# Patient Record
Sex: MALE | Race: WHITE | Employment: OTHER | URBAN - METROPOLITAN AREA
[De-identification: names, ages, dates, MRNs, and addresses within clinical notes are randomized per-mention and may not be internally consistent; named-entity substitution may affect disease eponyms.]

---

## 2019-03-19 ENCOUNTER — HOSPITAL ENCOUNTER (EMERGENCY)
Age: 49
Discharge: HOME | End: 2019-03-19
Attending: EMERGENCY MEDICINE
Payer: MEDICARE

## 2019-03-19 ENCOUNTER — APPOINTMENT (OUTPATIENT)
Dept: CT IMAGING | Age: 49
End: 2019-03-19
Payer: MEDICARE

## 2019-03-19 VITALS
BODY MASS INDEX: 31.81 KG/M2 | SYSTOLIC BLOOD PRESSURE: 169 MMHG | DIASTOLIC BLOOD PRESSURE: 109 MMHG | TEMPERATURE: 98 F | WEIGHT: 240 LBS | HEART RATE: 86 BPM | OXYGEN SATURATION: 98 % | RESPIRATION RATE: 16 BRPM | HEIGHT: 73 IN

## 2019-03-19 DIAGNOSIS — R20.2 PARESTHESIA: ICD-10-CM

## 2019-03-19 DIAGNOSIS — R25.1 SHAKING: Primary | ICD-10-CM

## 2019-03-19 LAB
ALBUMIN SERPL-MCNC: 4.2 G/DL (ref 3.5–5.2)
ALP BLD-CCNC: 74 U/L (ref 40–129)
ALT SERPL-CCNC: 31 U/L (ref 0–40)
AMPHETAMINE SCREEN, URINE: NOT DETECTED
ANION GAP SERPL CALCULATED.3IONS-SCNC: 10 MMOL/L (ref 7–16)
AST SERPL-CCNC: 20 U/L (ref 0–39)
BARBITURATE SCREEN URINE: NOT DETECTED
BENZODIAZEPINE SCREEN, URINE: NOT DETECTED
BILIRUB SERPL-MCNC: 0.3 MG/DL (ref 0–1.2)
BILIRUBIN URINE: NEGATIVE
BLOOD, URINE: NEGATIVE
BUN BLDV-MCNC: 9 MG/DL (ref 6–20)
CALCIUM SERPL-MCNC: 9.2 MG/DL (ref 8.6–10.2)
CANNABINOID SCREEN URINE: NOT DETECTED
CHLORIDE BLD-SCNC: 93 MMOL/L (ref 98–107)
CLARITY: CLEAR
CO2: 27 MMOL/L (ref 22–29)
COCAINE METABOLITE SCREEN URINE: NOT DETECTED
COLOR: YELLOW
CREAT SERPL-MCNC: 1.2 MG/DL (ref 0.7–1.2)
GFR AFRICAN AMERICAN: >60
GFR NON-AFRICAN AMERICAN: >60 ML/MIN/1.73
GLUCOSE BLD-MCNC: 101 MG/DL (ref 74–99)
GLUCOSE URINE: NEGATIVE MG/DL
HCT VFR BLD CALC: 44 % (ref 37–54)
HEMOGLOBIN: 15 G/DL (ref 12.5–16.5)
KETONES, URINE: NEGATIVE MG/DL
LEUKOCYTE ESTERASE, URINE: NEGATIVE
MCH RBC QN AUTO: 29.6 PG (ref 26–35)
MCHC RBC AUTO-ENTMCNC: 34.1 % (ref 32–34.5)
MCV RBC AUTO: 86.8 FL (ref 80–99.9)
METHADONE SCREEN, URINE: NOT DETECTED
NITRITE, URINE: NEGATIVE
OPIATE SCREEN URINE: NOT DETECTED
PDW BLD-RTO: 12.2 FL (ref 11.5–15)
PH UA: 6 (ref 5–9)
PHENCYCLIDINE SCREEN URINE: NOT DETECTED
PLATELET # BLD: 280 E9/L (ref 130–450)
PMV BLD AUTO: 8.5 FL (ref 7–12)
POTASSIUM REFLEX MAGNESIUM: 4.8 MMOL/L (ref 3.5–5)
PROPOXYPHENE SCREEN: NOT DETECTED
PROTEIN UA: NEGATIVE MG/DL
RBC # BLD: 5.07 E12/L (ref 3.8–5.8)
SODIUM BLD-SCNC: 130 MMOL/L (ref 132–146)
SPECIFIC GRAVITY UA: 1.01 (ref 1–1.03)
TOTAL PROTEIN: 7.8 G/DL (ref 6.4–8.3)
TSH SERPL DL<=0.05 MIU/L-ACNC: 2.9 UIU/ML (ref 0.27–4.2)
UROBILINOGEN, URINE: 0.2 E.U./DL
WBC # BLD: 7.9 E9/L (ref 4.5–11.5)

## 2019-03-19 PROCEDURE — 80053 COMPREHEN METABOLIC PANEL: CPT

## 2019-03-19 PROCEDURE — 36415 COLL VENOUS BLD VENIPUNCTURE: CPT

## 2019-03-19 PROCEDURE — 84443 ASSAY THYROID STIM HORMONE: CPT

## 2019-03-19 PROCEDURE — 70450 CT HEAD/BRAIN W/O DYE: CPT

## 2019-03-19 PROCEDURE — 99284 EMERGENCY DEPT VISIT MOD MDM: CPT

## 2019-03-19 PROCEDURE — 93005 ELECTROCARDIOGRAM TRACING: CPT

## 2019-03-19 PROCEDURE — 81003 URINALYSIS AUTO W/O SCOPE: CPT

## 2019-03-19 PROCEDURE — 85027 COMPLETE CBC AUTOMATED: CPT

## 2019-03-19 PROCEDURE — 80307 DRUG TEST PRSMV CHEM ANLYZR: CPT

## 2019-03-19 PROCEDURE — 2580000003 HC RX 258: Performed by: EMERGENCY MEDICINE

## 2019-03-19 RX ORDER — SODIUM CHLORIDE 9 MG/ML
INJECTION, SOLUTION INTRAVENOUS ONCE
Status: COMPLETED | OUTPATIENT
Start: 2019-03-19 | End: 2019-03-19

## 2019-03-19 RX ADMIN — SODIUM CHLORIDE: 9 INJECTION, SOLUTION INTRAVENOUS at 20:08

## 2019-03-19 ASSESSMENT — ENCOUNTER SYMPTOMS
ABDOMINAL PAIN: 0
COUGH: 0
SORE THROAT: 0
SINUS PRESSURE: 0
EYE DISCHARGE: 0
EYE REDNESS: 0
DIARRHEA: 0
EYE PAIN: 0
WHEEZING: 0
NAUSEA: 0
VOMITING: 0
BACK PAIN: 0
SHORTNESS OF BREATH: 0

## 2019-03-21 LAB
EKG ATRIAL RATE: 98 BPM
EKG P AXIS: 61 DEGREES
EKG P-R INTERVAL: 148 MS
EKG Q-T INTERVAL: 346 MS
EKG QRS DURATION: 78 MS
EKG QTC CALCULATION (BAZETT): 441 MS
EKG R AXIS: 30 DEGREES
EKG T AXIS: 44 DEGREES
EKG VENTRICULAR RATE: 98 BPM

## 2022-07-15 ENCOUNTER — HOSPITAL ENCOUNTER (INPATIENT)
Age: 52
LOS: 3 days | Discharge: HOME | DRG: 310 | End: 2022-07-18
Attending: EMERGENCY MEDICINE | Admitting: INTERNAL MEDICINE
Payer: MEDICARE

## 2022-07-15 ENCOUNTER — APPOINTMENT (OUTPATIENT)
Dept: GENERAL RADIOLOGY | Age: 52
DRG: 310 | End: 2022-07-15
Payer: MEDICARE

## 2022-07-15 DIAGNOSIS — I48.91 ATRIAL FIBRILLATION WITH RAPID VENTRICULAR RESPONSE (HCC): Primary | ICD-10-CM

## 2022-07-15 LAB
ANION GAP SERPL CALCULATED.3IONS-SCNC: 13 MMOL/L (ref 7–16)
BASOPHILS ABSOLUTE: 0.05 E9/L (ref 0–0.2)
BASOPHILS RELATIVE PERCENT: 0.7 % (ref 0–2)
BUN BLDV-MCNC: 14 MG/DL (ref 6–20)
CALCIUM SERPL-MCNC: 8.7 MG/DL (ref 8.6–10.2)
CHLORIDE BLD-SCNC: 96 MMOL/L (ref 98–107)
CO2: 23 MMOL/L (ref 22–29)
CREAT SERPL-MCNC: 1.3 MG/DL (ref 0.7–1.2)
EOSINOPHILS ABSOLUTE: 0.13 E9/L (ref 0.05–0.5)
EOSINOPHILS RELATIVE PERCENT: 1.8 % (ref 0–6)
GFR AFRICAN AMERICAN: >60
GFR NON-AFRICAN AMERICAN: 58 ML/MIN/1.73
GLUCOSE BLD-MCNC: 142 MG/DL (ref 74–99)
HCT VFR BLD CALC: 40.2 % (ref 37–54)
HEMOGLOBIN: 13.5 G/DL (ref 12.5–16.5)
IMMATURE GRANULOCYTES #: 0.05 E9/L
IMMATURE GRANULOCYTES %: 0.7 % (ref 0–5)
LACTIC ACID: 1.9 MMOL/L (ref 0.5–2.2)
LYMPHOCYTES ABSOLUTE: 1.53 E9/L (ref 1.5–4)
LYMPHOCYTES RELATIVE PERCENT: 21.6 % (ref 20–42)
MAGNESIUM: 1.6 MG/DL (ref 1.6–2.6)
MCH RBC QN AUTO: 28.2 PG (ref 26–35)
MCHC RBC AUTO-ENTMCNC: 33.6 % (ref 32–34.5)
MCV RBC AUTO: 84.1 FL (ref 80–99.9)
MONOCYTES ABSOLUTE: 0.5 E9/L (ref 0.1–0.95)
MONOCYTES RELATIVE PERCENT: 7.1 % (ref 2–12)
NEUTROPHILS ABSOLUTE: 4.83 E9/L (ref 1.8–7.3)
NEUTROPHILS RELATIVE PERCENT: 68.1 % (ref 43–80)
PDW BLD-RTO: 12.9 FL (ref 11.5–15)
PLATELET # BLD: 230 E9/L (ref 130–450)
PMV BLD AUTO: 9.4 FL (ref 7–12)
POTASSIUM REFLEX MAGNESIUM: 4.3 MMOL/L (ref 3.5–5)
PRO-BNP: 1712 PG/ML (ref 0–125)
RBC # BLD: 4.78 E12/L (ref 3.8–5.8)
SODIUM BLD-SCNC: 132 MMOL/L (ref 132–146)
TROPONIN, HIGH SENSITIVITY: 8 NG/L (ref 0–11)
TSH SERPL DL<=0.05 MIU/L-ACNC: 2.6 UIU/ML (ref 0.27–4.2)
WBC # BLD: 7.1 E9/L (ref 4.5–11.5)

## 2022-07-15 PROCEDURE — 99285 EMERGENCY DEPT VISIT HI MDM: CPT

## 2022-07-15 PROCEDURE — 84439 ASSAY OF FREE THYROXINE: CPT

## 2022-07-15 PROCEDURE — 80307 DRUG TEST PRSMV CHEM ANLYZR: CPT

## 2022-07-15 PROCEDURE — 93005 ELECTROCARDIOGRAM TRACING: CPT | Performed by: PHYSICIAN ASSISTANT

## 2022-07-15 PROCEDURE — 83605 ASSAY OF LACTIC ACID: CPT

## 2022-07-15 PROCEDURE — 93005 ELECTROCARDIOGRAM TRACING: CPT | Performed by: EMERGENCY MEDICINE

## 2022-07-15 PROCEDURE — 84443 ASSAY THYROID STIM HORMONE: CPT

## 2022-07-15 PROCEDURE — 2500000003 HC RX 250 WO HCPCS: Performed by: EMERGENCY MEDICINE

## 2022-07-15 PROCEDURE — 2580000003 HC RX 258: Performed by: PHYSICIAN ASSISTANT

## 2022-07-15 PROCEDURE — 83880 ASSAY OF NATRIURETIC PEPTIDE: CPT

## 2022-07-15 PROCEDURE — 80048 BASIC METABOLIC PNL TOTAL CA: CPT

## 2022-07-15 PROCEDURE — 2060000000 HC ICU INTERMEDIATE R&B

## 2022-07-15 PROCEDURE — 6360000002 HC RX W HCPCS: Performed by: PHYSICIAN ASSISTANT

## 2022-07-15 PROCEDURE — 84484 ASSAY OF TROPONIN QUANT: CPT

## 2022-07-15 PROCEDURE — 83735 ASSAY OF MAGNESIUM: CPT

## 2022-07-15 PROCEDURE — 85025 COMPLETE CBC W/AUTO DIFF WBC: CPT

## 2022-07-15 PROCEDURE — 6370000000 HC RX 637 (ALT 250 FOR IP): Performed by: PHYSICIAN ASSISTANT

## 2022-07-15 PROCEDURE — 36415 COLL VENOUS BLD VENIPUNCTURE: CPT

## 2022-07-15 PROCEDURE — 71045 X-RAY EXAM CHEST 1 VIEW: CPT

## 2022-07-15 RX ORDER — POLYETHYLENE GLYCOL 3350 17 G/17G
17 POWDER, FOR SOLUTION ORAL DAILY PRN
Status: DISCONTINUED | OUTPATIENT
Start: 2022-07-15 | End: 2022-07-18 | Stop reason: HOSPADM

## 2022-07-15 RX ORDER — LOVASTATIN 40 MG/1
40 TABLET ORAL NIGHTLY
COMMUNITY
End: 2022-07-18

## 2022-07-15 RX ORDER — GABAPENTIN 300 MG/1
300 CAPSULE ORAL NIGHTLY
Status: DISCONTINUED | OUTPATIENT
Start: 2022-07-15 | End: 2022-07-18 | Stop reason: HOSPADM

## 2022-07-15 RX ORDER — ACETAMINOPHEN 325 MG/1
650 TABLET ORAL EVERY 6 HOURS PRN
Status: DISCONTINUED | OUTPATIENT
Start: 2022-07-15 | End: 2022-07-18 | Stop reason: HOSPADM

## 2022-07-15 RX ORDER — QUETIAPINE FUMARATE 50 MG/1
50 TABLET, FILM COATED ORAL NIGHTLY
COMMUNITY

## 2022-07-15 RX ORDER — ACETAMINOPHEN 650 MG/1
650 SUPPOSITORY RECTAL EVERY 6 HOURS PRN
Status: DISCONTINUED | OUTPATIENT
Start: 2022-07-15 | End: 2022-07-18 | Stop reason: HOSPADM

## 2022-07-15 RX ORDER — GABAPENTIN 300 MG/1
300 CAPSULE ORAL NIGHTLY
COMMUNITY

## 2022-07-15 RX ORDER — METOPROLOL TARTRATE 50 MG/1
50 TABLET, FILM COATED ORAL 2 TIMES DAILY
Status: DISCONTINUED | OUTPATIENT
Start: 2022-07-15 | End: 2022-07-16

## 2022-07-15 RX ORDER — SODIUM CHLORIDE 0.9 % (FLUSH) 0.9 %
5-40 SYRINGE (ML) INJECTION EVERY 12 HOURS SCHEDULED
Status: DISCONTINUED | OUTPATIENT
Start: 2022-07-15 | End: 2022-07-18 | Stop reason: HOSPADM

## 2022-07-15 RX ORDER — SODIUM CHLORIDE 9 MG/ML
INJECTION, SOLUTION INTRAVENOUS PRN
Status: DISCONTINUED | OUTPATIENT
Start: 2022-07-15 | End: 2022-07-18 | Stop reason: HOSPADM

## 2022-07-15 RX ORDER — SODIUM CHLORIDE 0.9 % (FLUSH) 0.9 %
5-40 SYRINGE (ML) INJECTION PRN
Status: DISCONTINUED | OUTPATIENT
Start: 2022-07-15 | End: 2022-07-18 | Stop reason: HOSPADM

## 2022-07-15 RX ORDER — ENOXAPARIN SODIUM 150 MG/ML
1 INJECTION SUBCUTANEOUS 2 TIMES DAILY
Status: DISCONTINUED | OUTPATIENT
Start: 2022-07-15 | End: 2022-07-18

## 2022-07-15 RX ORDER — METOPROLOL TARTRATE 50 MG/1
50 TABLET, FILM COATED ORAL 2 TIMES DAILY
COMMUNITY
End: 2022-07-18

## 2022-07-15 RX ORDER — DILTIAZEM HYDROCHLORIDE 5 MG/ML
15 INJECTION INTRAVENOUS ONCE
Status: COMPLETED | OUTPATIENT
Start: 2022-07-15 | End: 2022-07-15

## 2022-07-15 RX ORDER — LANOLIN ALCOHOL/MO/W.PET/CERES
9 CREAM (GRAM) TOPICAL NIGHTLY PRN
Status: DISCONTINUED | OUTPATIENT
Start: 2022-07-15 | End: 2022-07-18 | Stop reason: HOSPADM

## 2022-07-15 RX ORDER — ATORVASTATIN CALCIUM 10 MG/1
10 TABLET, FILM COATED ORAL DAILY
Status: DISCONTINUED | OUTPATIENT
Start: 2022-07-16 | End: 2022-07-18 | Stop reason: HOSPADM

## 2022-07-15 RX ORDER — LANOLIN ALCOHOL/MO/W.PET/CERES
10 CREAM (GRAM) TOPICAL NIGHTLY PRN
COMMUNITY

## 2022-07-15 RX ORDER — QUETIAPINE FUMARATE 25 MG/1
50 TABLET, FILM COATED ORAL NIGHTLY
Status: DISCONTINUED | OUTPATIENT
Start: 2022-07-15 | End: 2022-07-17

## 2022-07-15 RX ORDER — PROMETHAZINE HYDROCHLORIDE 25 MG/1
12.5 TABLET ORAL EVERY 6 HOURS PRN
Status: DISCONTINUED | OUTPATIENT
Start: 2022-07-15 | End: 2022-07-18 | Stop reason: HOSPADM

## 2022-07-15 RX ORDER — ONDANSETRON 2 MG/ML
4 INJECTION INTRAMUSCULAR; INTRAVENOUS EVERY 6 HOURS PRN
Status: DISCONTINUED | OUTPATIENT
Start: 2022-07-15 | End: 2022-07-18 | Stop reason: HOSPADM

## 2022-07-15 RX ORDER — ENOXAPARIN SODIUM 150 MG/ML
1 INJECTION SUBCUTANEOUS ONCE
Status: DISCONTINUED | OUTPATIENT
Start: 2022-07-15 | End: 2022-07-15 | Stop reason: DRUGHIGH

## 2022-07-15 RX ADMIN — DILTIAZEM HYDROCHLORIDE 15 MG: 5 INJECTION INTRAVENOUS at 19:14

## 2022-07-15 RX ADMIN — DEXTROSE MONOHYDRATE 5 MG/HR: 50 INJECTION, SOLUTION INTRAVENOUS at 19:18

## 2022-07-15 RX ADMIN — Medication 9 MG: at 23:14

## 2022-07-15 RX ADMIN — ENOXAPARIN SODIUM 120 MG: 150 INJECTION SUBCUTANEOUS at 23:15

## 2022-07-15 RX ADMIN — QUETIAPINE FUMARATE 50 MG: 25 TABLET ORAL at 23:15

## 2022-07-15 RX ADMIN — METOPROLOL TARTRATE 50 MG: 50 TABLET, FILM COATED ORAL at 23:15

## 2022-07-15 RX ADMIN — GABAPENTIN 300 MG: 300 CAPSULE ORAL at 23:14

## 2022-07-15 RX ADMIN — Medication 10 ML: at 23:16

## 2022-07-15 ASSESSMENT — ENCOUNTER SYMPTOMS
BACK PAIN: 0
ABDOMINAL PAIN: 0
EYE REDNESS: 0
SHORTNESS OF BREATH: 0
FACIAL SWELLING: 0
RECTAL PAIN: 0
COUGH: 0
NAUSEA: 0

## 2022-07-15 NOTE — ED PROVIDER NOTES
History:  has a past medical history of Anxiety, Depression, and Hypertension. Past Surgical History:  has no past surgical history on file. Social History:  reports that he has never smoked. His smokeless tobacco use includes snuff. Family History: family history is not on file. The patients home medications have been reviewed. Allergies: Patient has no known allergies.     -------------------------------------------------- RESULTS -------------------------------------------------  All laboratory and radiology results have been personally reviewed by myself   LABS:  Results for orders placed or performed during the hospital encounter of 07/15/22   Lactic Acid   Result Value Ref Range    Lactic Acid 1.9 0.5 - 2.2 mmol/L   CBC with Auto Differential   Result Value Ref Range    WBC 7.1 4.5 - 11.5 E9/L    RBC 4.78 3.80 - 5.80 E12/L    Hemoglobin 13.5 12.5 - 16.5 g/dL    Hematocrit 40.2 37.0 - 54.0 %    MCV 84.1 80.0 - 99.9 fL    MCH 28.2 26.0 - 35.0 pg    MCHC 33.6 32.0 - 34.5 %    RDW 12.9 11.5 - 15.0 fL    Platelets 843 596 - 235 E9/L    MPV 9.4 7.0 - 12.0 fL    Neutrophils % 68.1 43.0 - 80.0 %    Immature Granulocytes % 0.7 0.0 - 5.0 %    Lymphocytes % 21.6 20.0 - 42.0 %    Monocytes % 7.1 2.0 - 12.0 %    Eosinophils % 1.8 0.0 - 6.0 %    Basophils % 0.7 0.0 - 2.0 %    Neutrophils Absolute 4.83 1.80 - 7.30 E9/L    Immature Granulocytes # 0.05 E9/L    Lymphocytes Absolute 1.53 1.50 - 4.00 E9/L    Monocytes Absolute 0.50 0.10 - 0.95 E9/L    Eosinophils Absolute 0.13 0.05 - 0.50 E9/L    Basophils Absolute 0.05 0.00 - 0.20 N4/R   Basic Metabolic Panel w/ Reflex to MG   Result Value Ref Range    Sodium 132 132 - 146 mmol/L    Potassium reflex Magnesium 4.3 3.5 - 5.0 mmol/L    Chloride 96 (L) 98 - 107 mmol/L    CO2 23 22 - 29 mmol/L    Anion Gap 13 7 - 16 mmol/L    Glucose 142 (H) 74 - 99 mg/dL    BUN 14 6 - 20 mg/dL    CREATININE 1.3 (H) 0.7 - 1.2 mg/dL    GFR Non-African American 58 >=60 mL/min/1.73 Musculoskeletal:         General: No swelling or deformity. Cervical back: Normal range of motion and neck supple. No muscular tenderness. Right lower leg: No edema. Left lower leg: No edema. Skin:     General: Skin is warm and dry. Capillary Refill: Capillary refill takes less than 2 seconds. Neurological:      General: No focal deficit present. Mental Status: He is alert and oriented to person, place, and time. Sensory: No sensory deficit. Motor: No weakness. Psychiatric:         Mood and Affect: Mood normal.             ------------------------------ ED COURSE/MEDICAL DECISION MAKING----------------------  Medications   dilTIAZem 100 mg in dextrose 5 % 100 mL infusion ([facility]) (5 mg/hr IntraVENous New Bag 7/15/22 1918)   enoxaparin (LOVENOX) injection 120 mg (has no administration in time range)   dilTIAZem injection 15 mg (15 mg IntraVENous Given 7/15/22 1914)     EKG: This EKG is signed and interpreted by me. [facility]:[id number]  Rate: 126  Rhythm: Atrial fibrillation and Rapid ventricular response  Interpretation: nonspecific st/t changes  Comparison: no previous EKG available      ED COURSE:  ED Course as of 07/15/22 2018   Fri Jul 15, 2022   1911 D/w dr Tana jeter for dr Olga Macias will admit [KARI]   1926 HR now in 90s post cardizem [KARI]      ED Course User Index  [KARI] Ada Means DO     Critical care:  Please note that the withdrawal or failure to initiate urgent interventions for this patient would likely result in a life threatening deterioration or permanent disability. Accordingly this patient received 30 minutes of critical care time, excluding separately billable procedures. [id number]  Atrial Fibrillation Risk Score Calculator for Non-Rheumatic AF     Condition Points   C    Congestive Heart Failure no (0)   H    Hypertension:  blood pressure consistently above 140/90 mmHg (or treated     hypertension on medication)   yes (1)   A    Age ? 75 years no   A    Age 65-74 years no (0)   D    Diabetes Mellitus no (0)   S    Prior Stroke / TIA or Thromboembolism no   V    Vascular Disease no (0)   S    Sex Female no (0)        TOTAL POINTS 1     Annual Stroke Risk:  According to the 2012 ESC Guideline Updates    CHADS2 Score Stroke Risk %   0 0.78   1 2   2 3.7   3 5.9   4 9.3   5 15.3   6 19.7                       7                                     21.5                                 8 22.4   9 23.6         Medical Decision Making:    Patient found to have A. fib with rapid ventricular response. Patient is not anticoagulated. Patient's Arben score is a 1. We will give him a dose of Lovenox since he has been admitted to the hospital.  He was given IV bolus of Cardizem placed on Cardizem drip. His heart rate is markedly improved. Patient will be admitted to the hospital for further cardiology evaluation. I discussed with the hospitalist who will admit. Counseling: The emergency provider has spoken with the patient and discussed todays results, in addition to providing specific details for the plan of care and counseling regarding the diagnosis and prognosis. Questions are answered at this time and they are agreeable with the plan.      --------------------------------- IMPRESSION AND DISPOSITION ---------------------------------    IMPRESSION  1. Atrial fibrillation with rapid ventricular response (HCC)        DISPOSITION  Disposition: Admit to telemetry  Patient condition is fair      NOTE: This report was transcribed using voice recognition software.  Every effort was made to ensure accuracy; however, inadvertent computerized transcription errors may be present       Padmini Han DO  07/15/22 2018

## 2022-07-15 NOTE — Clinical Note
Discharge Plan[de-identified] Other/[facility])   Telemetry/Cardiac Monitoring Required?: Yes

## 2022-07-16 LAB
AMPHETAMINE SCREEN, URINE: NOT DETECTED
BARBITURATE SCREEN URINE: NOT DETECTED
BENZODIAZEPINE SCREEN, URINE: NOT DETECTED
CANNABINOID SCREEN URINE: NOT DETECTED
COCAINE METABOLITE SCREEN URINE: NOT DETECTED
FENTANYL SCREEN, URINE: NOT DETECTED
LACTIC ACID: 1.3 MMOL/L (ref 0.5–2.2)
LV EF: 60 %
LVEF MODALITY: NORMAL
Lab: NORMAL
METHADONE SCREEN, URINE: NOT DETECTED
OPIATE SCREEN URINE: NOT DETECTED
OXYCODONE URINE: NOT DETECTED
PHENCYCLIDINE SCREEN URINE: NOT DETECTED
T3 TOTAL: 102 NG/DL (ref 80–200)
T4 FREE: 0.92 NG/DL (ref 0.93–1.7)
TROPONIN, HIGH SENSITIVITY: 10 NG/L (ref 0–11)
TROPONIN, HIGH SENSITIVITY: 9 NG/L (ref 0–11)

## 2022-07-16 PROCEDURE — 2060000000 HC ICU INTERMEDIATE R&B

## 2022-07-16 PROCEDURE — 93306 TTE W/DOPPLER COMPLETE: CPT

## 2022-07-16 PROCEDURE — 6360000002 HC RX W HCPCS: Performed by: PHYSICIAN ASSISTANT

## 2022-07-16 PROCEDURE — 6360000002 HC RX W HCPCS: Performed by: NURSE PRACTITIONER

## 2022-07-16 PROCEDURE — 2580000003 HC RX 258: Performed by: PHYSICIAN ASSISTANT

## 2022-07-16 PROCEDURE — 6370000000 HC RX 637 (ALT 250 FOR IP): Performed by: PHYSICIAN ASSISTANT

## 2022-07-16 PROCEDURE — 99222 1ST HOSP IP/OBS MODERATE 55: CPT | Performed by: INTERNAL MEDICINE

## 2022-07-16 PROCEDURE — 6370000000 HC RX 637 (ALT 250 FOR IP): Performed by: INTERNAL MEDICINE

## 2022-07-16 PROCEDURE — 84484 ASSAY OF TROPONIN QUANT: CPT

## 2022-07-16 PROCEDURE — 36415 COLL VENOUS BLD VENIPUNCTURE: CPT

## 2022-07-16 PROCEDURE — 83605 ASSAY OF LACTIC ACID: CPT

## 2022-07-16 PROCEDURE — 84480 ASSAY TRIIODOTHYRONINE (T3): CPT

## 2022-07-16 RX ORDER — METOPROLOL SUCCINATE 50 MG/1
50 TABLET, EXTENDED RELEASE ORAL 2 TIMES DAILY
Status: DISCONTINUED | OUTPATIENT
Start: 2022-07-16 | End: 2022-07-17

## 2022-07-16 RX ORDER — DOCUSATE SODIUM 100 MG/1
100 CAPSULE, LIQUID FILLED ORAL NIGHTLY
Status: DISCONTINUED | OUTPATIENT
Start: 2022-07-16 | End: 2022-07-18 | Stop reason: HOSPADM

## 2022-07-16 RX ORDER — HYDRALAZINE HYDROCHLORIDE 20 MG/ML
5 INJECTION INTRAMUSCULAR; INTRAVENOUS EVERY 6 HOURS PRN
Status: DISCONTINUED | OUTPATIENT
Start: 2022-07-16 | End: 2022-07-18 | Stop reason: HOSPADM

## 2022-07-16 RX ADMIN — Medication 9 MG: at 20:10

## 2022-07-16 RX ADMIN — QUETIAPINE FUMARATE 50 MG: 25 TABLET ORAL at 20:11

## 2022-07-16 RX ADMIN — ENOXAPARIN SODIUM 120 MG: 150 INJECTION SUBCUTANEOUS at 09:28

## 2022-07-16 RX ADMIN — METOPROLOL SUCCINATE 50 MG: 50 TABLET, EXTENDED RELEASE ORAL at 16:29

## 2022-07-16 RX ADMIN — ENOXAPARIN SODIUM 120 MG: 150 INJECTION SUBCUTANEOUS at 20:10

## 2022-07-16 RX ADMIN — METOPROLOL TARTRATE 50 MG: 50 TABLET, FILM COATED ORAL at 09:28

## 2022-07-16 RX ADMIN — GABAPENTIN 300 MG: 300 CAPSULE ORAL at 20:11

## 2022-07-16 RX ADMIN — Medication 10 ML: at 20:13

## 2022-07-16 RX ADMIN — HYDRALAZINE HYDROCHLORIDE 5 MG: 20 INJECTION, SOLUTION INTRAMUSCULAR; INTRAVENOUS at 20:11

## 2022-07-16 RX ADMIN — ATORVASTATIN CALCIUM 10 MG: 10 TABLET, FILM COATED ORAL at 20:10

## 2022-07-16 NOTE — H&P
Hospital Medicine History & Physical      PCP: Keely Malave MD    Date of Admission: 7/15/2022    Date of Service: . July 16, 2022    Chief Complaint:  PALPITATION AND CHEST PAIN       History Of Present Illness:     45 y.o. male presented with  ONSET 2 WEEKS AGO, [address]. CHEST PAIN SQUEEZING IN CHARACTER, LOCATED ACW, POS SOB, .  NON RADIATING , NO NV, NO DIAPHORESIS. Past Medical History:          Diagnosis Date    Anxiety     Depression     Hypertension        Past Surgical History:      History reviewed. No pertinent surgical history. Medications Prior to Admission:      Prior to Admission medications    Medication Sig Start Date End Date Taking? Authorizing Provider   QUEtiapine (SEROQUEL) 50 MG tablet Take 50 mg by mouth nightly Take 3 tablets at bedtime   Yes Historical Provider, MD   lovastatin (MEVACOR) 40 MG tablet Take 40 mg by mouth nightly   Yes Historical Provider, MD   metoprolol tartrate (LOPRESSOR) 50 MG tablet Take 50 mg by mouth in the morning and 50 mg before bedtime. Yes Historical Provider, MD   melatonin 3 MG TABS tablet Take 10 mg by mouth nightly as needed   Yes Historical Provider, MD   gabapentin (NEURONTIN) 300 MG capsule Take 300 mg by mouth nightly. Yes Historical Provider, MD   lamoTRIgine (LAMICTAL) 200 MG tablet Take 200 mg by mouth nightly    Historical Provider, MD   QUEtiapine (SEROQUEL XR) 200 MG XR tablet Take 200 mg by mouth nightly. Historical Provider, MD   QUEtiapine (SEROQUEL) 25 MG tablet Take 25 mg by mouth nightly    Historical Provider, MD   zolpidem (AMBIEN) 10 MG tablet Take 10 mg by mouth nightly as needed. Historical Provider, MD   clonazePAM (KLONOPIN) 2 MG tablet Take 2 mg by mouth 3 times daily as needed.     Historical Provider, MD   lisinopril (PRINIVIL;ZESTRIL) 20 MG tablet Take 40 mg by mouth in the morning. Robby Leiva Historical Provider, MD       Allergies:  Patient has no known allergies. Social History:      The patient currently lives ALONE    TOBACCO:   reports that he has never smoked. His smokeless tobacco use includes snuff. ETOH:   has no history on file for alcohol use. Family History:    NOT OBTAINED      REVIEW OF SYSTEMS:   Pertinent positives as noted in the HPI. All other systems reviewed and negative. PHYSICAL EXAM:    BP (!) 150/94   Pulse 75   Temp 98.8 °F (37.1 °C) (Oral)   Resp 16   Ht 6' 1\" (1.854 m)   Wt 248 lb 4.8 oz (112.6 kg)   SpO2 99%   BMI 32.76 kg/m²     General appearance:  No apparent distress, appears stated age and cooperative. HEENT:  Normal cephalic, atraumatic without obvious deformity. Pupils equal, round, and reactive to light. Extra ocular muscles intact. Conjunctivae/corneas clear. Neck: Supple, with full range of motion. No jugular venous distention. Trachea midline. Respiratory:  Normal respiratory effort. Clear to auscultation, bilaterally without Rales/Wheezes/Rhonchi. Cardiovascular:  IRREG  Abdomen: Soft, non-tender, non-distended with normal bowel sounds. Musculoskeletal:  No clubbing, cyanosis or edema bilaterally. Skin: Skin color, texture, turgor normal.  No rashes or lesions. Neurologic:  Neurovascularly intact without any focal sensory/motor deficits. Cranial nerves: II-XII intact, grossly non-focal.  Psychiatric:  Alert and oriented, thought content appropriate, normal insight        Labs:     Recent Labs     07/15/22  1903   WBC 7.1   HGB 13.5   HCT 40.2        Recent Labs     07/15/22  1903      K 4.3   CL 96*   CO2 23   BUN 14   CREATININE 1.3*   CALCIUM 8.7     No results for input(s): AST, ALT, BILIDIR, BILITOT, ALKPHOS in the last 72 hours. No results for input(s): INR in the last 72 hours. No results for input(s): Curlie Lat in the last 72 hours.     Urinalysis:      Lab Results   Component Value

## 2022-07-16 NOTE — PROGRESS NOTES
Call back from Dr. Marcos Venegas , advised her of the pt blood pressure reading, she advised to administer the Toprol XL 50mg early. The medication is due at 1800.

## 2022-07-16 NOTE — PROGRESS NOTES
Dr Hawkins Repress paged and left message regarding his bp of 142/100 and no prn blood pressure med orders. Will wait for orders.

## 2022-07-16 NOTE — CONSULTS
CHIEF COMPLAINT: New onset Afib    HISTORY OF PRESENT ILLNESS: Patient is a 45 y.o. male seen at the request of Logan Morales MD and not followed by cardiology. States he has been having palpitations, SOB and chest symptoms for several weeks. Noted to have afib with RVR prompting cardiology consultation. Denies prior cardiac history. States some SOB currently.      Past Medical History:   Diagnosis Date    Anxiety     Depression     Hypertension        Patient Active Problem List   Diagnosis    Atrial fibrillation with rapid ventricular response (HCC)    Atrial fibrillation with RVR ([facility] [address]       No Known Allergies    Current Facility-Administered Medications   Medication Dose Route Frequency Provider Last Rate Last Admin    docusate sodium (COLACE) capsule 100 mg  100 mg Oral Nightly Gage Rise DrDO shira        trimethobenzamide Charmel Alderman) injection 200 mg  200 mg IntraMUSCular Q6H PRN Margarita Hawthorne DO        dilTIAZem 100 mg in dextrose 5 % 100 mL infusion ([facility])  2.5-15 mg/hr IntraVENous Continuous CANDIS Yost   Held at 07/16/22 0120    sodium chloride flush 0.9 % injection 5-40 mL  5-40 mL IntraVENous 2 times per day CANDIS Yost   10 mL at 07/15/22 2316    sodium chloride flush 0.9 % injection 5-40 mL  5-40 mL IntraVENous PRN CANDIS Yost        0.9 % sodium chloride infusion   IntraVENous PRN CANDIS Yost        polyethylene glycol (GLYCOLAX) packet 17 g  17 g Oral Daily PRN CANDIS Yost        acetaminophen (TYLENOL) tablet 650 mg  650 mg Oral Q6H PRN CANDIS Yost        Or    acetaminophen (TYLENOL) suppository 650 mg  650 mg Rectal Q6H PRN CANDIS Yost        promethazine (PHENERGAN) tablet 12.5 mg  12.5 mg Oral Q6H PRN CANDIS Yost        Or    ondansetron (ZOFRAN) injection 4 mg  4 mg IntraVENous Q6H PRN CANDIS Yost        enoxaparin (LOVENOX) injection 120 mg  1 mg/kg SubCUTAneous BID CANDIS Yost   120 mg at 07/16/22 5256    gabapentin (NEURONTIN) capsule 300 mg  300 mg Oral Nightly Hardik Mealing, PA   300 mg at 07/15/22 2314    atorvastatin (LIPITOR) tablet 10 mg  10 mg Oral Daily Hardik Mealing, PA        melatonin tablet 9 mg  9 mg Oral Nightly PRN Hardik Mealing, PA   9 mg at 07/15/22 2314    metoprolol tartrate (LOPRESSOR) tablet 50 mg  50 mg Oral BID Hardik Mealing, PA   50 mg at 07/16/22 3651    QUEtiapine (SEROQUEL) tablet 50 mg  50 mg Oral Nightly Hardik Mealing, PA   50 mg at 07/15/22 2315       Social History     Socioeconomic History    Marital status:      Spouse name: Not on file    Number of children: Not on file    Years of education: Not on file    Highest education level: Not on file   Occupational History    Not on file   Tobacco Use    Smoking status: Never    Smokeless tobacco: Current     Types: Snuff   Substance and Sexual Activity    Alcohol use: Not on file    Drug use: Not on file    Sexual activity: Not on file   Other Topics Concern    Not on file   Social History Narrative    Not on file     Social Determinants of Health     Financial Resource Strain: Not on file   Food Insecurity: Not on file   Transportation Needs: Not on file   Physical Activity: Not on file   Stress: Not on file   Social Connections: Not on file   Intimate Partner Violence: Not on file   Housing Stability: Not on file       History reviewed. No pertinent family history. Review of Systems:   Heart: as above   Lungs: as above   Eyes: denies changes in vision or discharge. Ears: denies changes in hearing or pain. Nose: denies epistaxis or masses   Throat: denies sore throat or trouble swallowing. Neuro: denies numbness, tingling, tremors. Skin: denies rashes or itching. : denies hematuria, dysuria   GI: denies vomiting, diarrhea   Psych: denies mood changed, anxiety, depression. all others negative.     Physical Exam   BP (!) 150/94   Pulse 75   Temp 98.1 °F (36.7 °C) (Oral)   Resp 16   Ht 6' 1\" (1.854 m) Wt 248 lb 4.8 oz (112.6 kg)   SpO2 100%   BMI 32.76 kg/m²   Constitutional: Oriented to person, place, and time. Well-developed and well-nourished. No distress. Head: Normocephalic and atraumatic. Eyes: EOM are normal. Pupils are equal, round, and reactive to light. Neck: Normal range of motion. Neck supple. No hepatojugular reflux and no JVD present. Carotid bruit is not present. No tracheal deviation present. No thyromegaly present. Cardiovascular: Normal rate, regular rhythm, normal heart sounds and intact distal pulses. Exam reveals no gallop and no friction rub. No murmur heard. Pulmonary/Chest: Effort normal and breath sounds normal. No respiratory distress. No wheezes. No rales. No tenderness. Abdominal: Soft. Bowel sounds are normal. No distension and no mass. No tenderness. No rebound and no guarding. Musculoskeletal: Normal range of motion. No edema and no tenderness. Lymphadenopathy:   No cervical adenopathy. No groin adenopathy. Neurological: Alert and oriented to person, place, and time. Skin: Skin is warm and dry. No rash noted. Not diaphoretic. No erythema. Psychiatric: Normal mood and affect.  Behavior is normal.     CBC:   Lab Results   Component Value Date/Time    WBC 7.1 07/15/2022 07:03 PM    RBC 4.78 07/15/2022 07:03 PM    HGB 13.5 07/15/2022 07:03 PM    HCT 40.2 07/15/2022 07:03 PM    MCV 84.1 07/15/2022 07:03 PM    MCH 28.2 07/15/2022 07:03 PM    MCHC 33.6 07/15/2022 07:03 PM    RDW 12.9 07/15/2022 07:03 PM     07/15/2022 07:03 PM    MPV 9.4 07/15/2022 07:03 PM     BMP:   Lab Results   Component Value Date/Time     07/15/2022 07:03 PM    K 4.3 07/15/2022 07:03 PM    CL 96 07/15/2022 07:03 PM    CO2 23 07/15/2022 07:03 PM    BUN 14 07/15/2022 07:03 PM    LABALBU 4.2 03/19/2019 06:29 PM    CREATININE 1.3 07/15/2022 07:03 PM    CALCIUM 8.7 07/15/2022 07:03 PM    GFRAA >60 07/15/2022 07:03 PM    LABGLOM 58 07/15/2022 07:03 PM     Magnesium:    Lab Results Component Value Date/Time    MG 1.6 07/15/2022 07:03 PM     Cardiac Enzymes:   Lab Results   Component Value Date    TROPHS 8 07/15/2022      PT/INR:  No results found for: PROTIME, INR  TSH:    Lab Results   Component Value Date/Time    TSH 2.600 07/15/2022 07:03 PM     Rhythm Strip: atrial fibrillation. EKG:  nonspecific ST and T waves changes, atrial fibrillation. ASSESSMENT AND PLAN:  Patient Active Problem List   Diagnosis    Atrial fibrillation with rapid ventricular response (HCC)    Atrial fibrillation with RVR ([address]     1. Afib:    Chart/labs/EKG reviewed. Echo. Rate control. Transition to PO BB. Lovenox for now. DOAC on discharge. JOSE-DCCV Monday AM.    2. HTN: Observe. Viviana García D.O.   Cardiologist  Cardiology, [facility]

## 2022-07-17 ENCOUNTER — ANESTHESIA (OUTPATIENT)
Dept: ONCOLOGY | Age: 52
End: 2022-07-17

## 2022-07-17 ENCOUNTER — ANESTHESIA EVENT (OUTPATIENT)
Dept: ONCOLOGY | Age: 52
End: 2022-07-17

## 2022-07-17 LAB
ANION GAP SERPL CALCULATED.3IONS-SCNC: 12 MMOL/L (ref 7–16)
BUN BLDV-MCNC: 10 MG/DL (ref 6–20)
CALCIUM SERPL-MCNC: 9.4 MG/DL (ref 8.6–10.2)
CHLORIDE BLD-SCNC: 98 MMOL/L (ref 98–107)
CO2: 27 MMOL/L (ref 22–29)
CREAT SERPL-MCNC: 1.3 MG/DL (ref 0.7–1.2)
GFR AFRICAN AMERICAN: >60
GFR NON-AFRICAN AMERICAN: 58 ML/MIN/1.73
GLUCOSE BLD-MCNC: 106 MG/DL (ref 74–99)
POTASSIUM SERPL-SCNC: 4.4 MMOL/L (ref 3.5–5)
SODIUM BLD-SCNC: 137 MMOL/L (ref 132–146)

## 2022-07-17 PROCEDURE — 80048 BASIC METABOLIC PNL TOTAL CA: CPT

## 2022-07-17 PROCEDURE — 99233 SBSQ HOSP IP/OBS HIGH 50: CPT | Performed by: INTERNAL MEDICINE

## 2022-07-17 PROCEDURE — 97161 PT EVAL LOW COMPLEX 20 MIN: CPT

## 2022-07-17 PROCEDURE — 2580000003 HC RX 258: Performed by: PHYSICIAN ASSISTANT

## 2022-07-17 PROCEDURE — 2060000000 HC ICU INTERMEDIATE R&B

## 2022-07-17 PROCEDURE — 6370000000 HC RX 637 (ALT 250 FOR IP): Performed by: INTERNAL MEDICINE

## 2022-07-17 PROCEDURE — 6360000002 HC RX W HCPCS: Performed by: INTERNAL MEDICINE

## 2022-07-17 PROCEDURE — 36415 COLL VENOUS BLD VENIPUNCTURE: CPT

## 2022-07-17 PROCEDURE — 6360000002 HC RX W HCPCS: Performed by: NURSE PRACTITIONER

## 2022-07-17 PROCEDURE — 6360000002 HC RX W HCPCS: Performed by: PHYSICIAN ASSISTANT

## 2022-07-17 PROCEDURE — 6370000000 HC RX 637 (ALT 250 FOR IP): Performed by: PHYSICIAN ASSISTANT

## 2022-07-17 RX ORDER — DIGOXIN 0.25 MG/ML
500 INJECTION INTRAMUSCULAR; INTRAVENOUS ONCE
Status: COMPLETED | OUTPATIENT
Start: 2022-07-17 | End: 2022-07-17

## 2022-07-17 RX ORDER — QUETIAPINE FUMARATE 25 MG/1
75 TABLET, FILM COATED ORAL NIGHTLY
Status: DISCONTINUED | OUTPATIENT
Start: 2022-07-17 | End: 2022-07-18 | Stop reason: HOSPADM

## 2022-07-17 RX ORDER — METOPROLOL SUCCINATE 100 MG/1
100 TABLET, EXTENDED RELEASE ORAL ONCE
Status: COMPLETED | OUTPATIENT
Start: 2022-07-17 | End: 2022-07-17

## 2022-07-17 RX ORDER — LAMOTRIGINE 100 MG/1
200 TABLET ORAL NIGHTLY
Status: DISCONTINUED | OUTPATIENT
Start: 2022-07-17 | End: 2022-07-18 | Stop reason: HOSPADM

## 2022-07-17 RX ORDER — METOPROLOL SUCCINATE 100 MG/1
100 TABLET, EXTENDED RELEASE ORAL 2 TIMES DAILY
Status: DISCONTINUED | OUTPATIENT
Start: 2022-07-17 | End: 2022-07-18 | Stop reason: HOSPADM

## 2022-07-17 RX ORDER — CLONAZEPAM 2 MG/1
2 TABLET ORAL EVERY 8 HOURS PRN
Status: DISCONTINUED | OUTPATIENT
Start: 2022-07-17 | End: 2022-07-18 | Stop reason: HOSPADM

## 2022-07-17 RX ADMIN — LAMOTRIGINE 200 MG: 100 TABLET ORAL at 21:35

## 2022-07-17 RX ADMIN — Medication 10 ML: at 08:52

## 2022-07-17 RX ADMIN — SODIUM CHLORIDE, PRESERVATIVE FREE 10 ML: 5 INJECTION INTRAVENOUS at 03:28

## 2022-07-17 RX ADMIN — DIGOXIN 500 MCG: 0.25 INJECTION INTRAMUSCULAR; INTRAVENOUS at 16:19

## 2022-07-17 RX ADMIN — ATORVASTATIN CALCIUM 10 MG: 10 TABLET, FILM COATED ORAL at 21:35

## 2022-07-17 RX ADMIN — METOPROLOL SUCCINATE 100 MG: 100 TABLET, EXTENDED RELEASE ORAL at 16:19

## 2022-07-17 RX ADMIN — GABAPENTIN 300 MG: 300 CAPSULE ORAL at 21:35

## 2022-07-17 RX ADMIN — HYDRALAZINE HYDROCHLORIDE 5 MG: 20 INJECTION, SOLUTION INTRAMUSCULAR; INTRAVENOUS at 03:27

## 2022-07-17 RX ADMIN — QUETIAPINE FUMARATE 75 MG: 25 TABLET ORAL at 21:36

## 2022-07-17 RX ADMIN — CLONAZEPAM 2 MG: 2 TABLET ORAL at 21:35

## 2022-07-17 RX ADMIN — Medication 10 ML: at 21:37

## 2022-07-17 RX ADMIN — ENOXAPARIN SODIUM 120 MG: 150 INJECTION SUBCUTANEOUS at 08:52

## 2022-07-17 RX ADMIN — HYDRALAZINE HYDROCHLORIDE 5 MG: 20 INJECTION, SOLUTION INTRAMUSCULAR; INTRAVENOUS at 23:39

## 2022-07-17 RX ADMIN — CLONAZEPAM 2 MG: 2 TABLET ORAL at 13:31

## 2022-07-17 RX ADMIN — ENOXAPARIN SODIUM 120 MG: 150 INJECTION SUBCUTANEOUS at 21:40

## 2022-07-17 RX ADMIN — METOPROLOL SUCCINATE 100 MG: 100 TABLET, EXTENDED RELEASE ORAL at 21:34

## 2022-07-17 RX ADMIN — METOPROLOL SUCCINATE 50 MG: 50 TABLET, EXTENDED RELEASE ORAL at 08:52

## 2022-07-17 NOTE — PROGRESS NOTES
Initial Evaluation        Attending Provider:  Rikki Dave DO    Evaluating PT:  Doris Bansal PT, RRT, [place] [place] [address]    Room #:  0889/3751-A  Diagnosis:  Atrial fibrillation with rapid ventricular response  Pertinent PMHx/PSHx:  See PMH  Procedure/Surgery:  NA  Precautions:  General  Equipment Needs:  None    SUBJECTIVE:    Pt lives with alone in a 2 story home with 3 stairs and 0 rail to enter. There are 12 steps and 1 rail to 2nd floor bed and bath. Pt ambulated with no AD PTA. OBJECTIVE:   Initial Evaluation  Date: 7/17/22 Treatment Short Term/ Long Term   Goals   Was pt agreeable to Eval/treatment? Yes     Does pt have pain? No     Bed Mobility  Rolling: indep  Supine to sit: indep  Sit to supine: indep  Scooting: indep     Transfers Sit to stand: indep  Stand to sit: indep  Stand pivot: indep     Ambulation   200 feet with no AD indep       Steps NA-predict indep based on mobility      ROM WFL     MMT WFL     AM-PAC 6 Clicks 12/72       Pt is A & O x 3   Sensation:  Pt denies numbness and tingling to extremities  Edema:  NA  Balance: sitting:indep and indep standing:   Endurance: Normal      ASSESSMENT:    Comments:  Was walking indep in [place] on contact. Indep all levels of mobility w/o any fall risks. Remained in room on my exit. Treatment:  Patient practiced and was instructed in the following treatment:    Eval    Pt's/ family goals   1. Home    Patient and or family understand(s) diagnosis, prognosis, and plan of care. PLAN:     No PT warranted    Total Treatment Time  0 minutes     Evaluation Time includes thorough review of current medical information, gathering information on past medical history/social history and prior level of function, completion of standardized testing/informal observation of tasks, assessment of data and education on plan of care and goals.     CPT codes:  [x] Low Complexity PT evaluation 43961  [] Moderate Complexity PT evaluation 28319  [] High Complexity PT evaluation [id number]  [] PT Re-evaluation [id number]  [] Gait training 10404 ** minutes  [] Manual therapy 31927 ** minutes  [] Therapeutic activities 17148 ** minutes  [] Therapeutic exercises 94763 ** minutes  [] Neuromuscular reeducation 05586 ** minutes    Ck Medrano PT, RRT, CEAS

## 2022-07-17 NOTE — PROGRESS NOTES
Hospitalist Progress Note      PCP: Rossy Lugo MD    Date of Admission: 7/15/2022        Hospital Course:  45 y.o. male presented with  ONSET 2 WEEKS AGO, [address]. CHEST PAIN SQUEEZING IN CHARACTER, LOCATED ACW, POS SOB, .  NON RADIATING , NO NV, NO DIAPHORESIS. He is for a DCCV in the am           Subjective: feeling anxious          Medications:  Reviewed    Infusion Medications    sodium chloride       Scheduled Medications    lamoTRIgine  200 mg Oral Nightly    QUEtiapine  75 mg Oral Nightly    docusate sodium  100 mg Oral Nightly    metoprolol succinate  50 mg Oral BID    sodium chloride flush  5-40 mL IntraVENous 2 times per day    enoxaparin  1 mg/kg SubCUTAneous BID    gabapentin  300 mg Oral Nightly    atorvastatin  10 mg Oral Daily     PRN Meds: clonazePAM, trimethobenzamide, perflutren lipid microspheres, hydrALAZINE, sodium chloride flush, sodium chloride, polyethylene glycol, acetaminophen **OR** acetaminophen, promethazine **OR** ondansetron, melatonin      Intake/Output Summary (Last 24 hours) at 7/17/2022 1306  Last data filed at 7/17/2022 0852  Gross per 24 hour   Intake 10 ml   Output --   Net 10 ml       Exam:    BP (!) 135/91   Pulse 84   Temp 98.6 °F (37 °C) (Oral)   Resp 18   Ht 6' 1\" (1.854 m)   Wt 244 lb 9.6 oz (110.9 kg)   SpO2 97%   BMI 32.27 kg/m²       General appearance:  No apparent distress, appears stated age and cooperative. HEENT:  Normal cephalic,   Neck: Supple, with full range of motion. No jugular venous distention. Trachea midline. Respiratory:  Normal respiratory effort. Clear to auscultation, bilaterally without Rales/Wheezes/Rhonchi. Cardiovascular:  IRREG  Abdomen: Soft, non-tender, non-distended with normal bowel sounds. Musculoskeletal:  No clubbing, cyanosis or edema bilaterally. Skin: Skin color, texture, turgor normal.  No rashes or lesions. Neurologic:  Neurovascularly intact without any focal sensory/motor deficits. Cranial nerves: II-XII intact, grossly non-focal.  Psychiatric:  Alert and oriented,           Labs:   Recent Labs     07/15/22  1903   WBC 7.1   HGB 13.5   HCT 40.2        Recent Labs     07/15/22  1903 07/17/22  0332    137   K 4.3 4.4   CL 96* 98   CO2 23 27   BUN 14 10   CREATININE 1.3* 1.3*   CALCIUM 8.7 9.4     No results for input(s): AST, ALT, BILIDIR, BILITOT, ALKPHOS in the last 72 hours. No results for input(s): INR in the last 72 hours. No results for input(s): Magdalene Zheng in the last 72 hours. No results for input(s): AST, ALT, ALB, BILIDIR, BILITOT, ALKPHOS in the last 72 hours. Recent Labs     07/15/22  1903 07/16/22  0402   LACTA 1.9 1.3     No results found for: Jon Dove  No results found for: AMMONIA    Assessment:    Active Hospital Problems    Diagnosis Date Noted    Atrial fibrillation with rapid ventricular response ([address] [I48.91] 07/15/2022     Priority: Medium    Atrial fibrillation with RVR ([address] [I48.91] 07/15/2022     Priority: Medium   HTN   HLD  ABNORMAL TFT'S      PLAN:  LOVENOX  DCCV ON Monday  HOME ON 4 [facility]  TOTAL T3(normal)     DVT Prophylaxis: LOVENOX  Diet: ADULT DIET;  Regular; Low Fat/Low Chol/High Fiber/2 gm Na  Diet NPO Exceptions are: Sips of Water with Meds  Code Status: Full Code     PT/OT Eval Status: NA     Dispo - HOME      Electronically signed by Matias Saint, DO on 7/17/2022 at 1:06 PM [facility]

## 2022-07-17 NOTE — PROGRESS NOTES
12.5 mg Oral Q6H PRN CANDIS Sanchez        Or    ondansetron [facility]) injection 4 mg  4 mg IntraVENous Q6H PRN CANDIS Sanchez        enoxaparin (LOVENOX) injection 120 mg  1 mg/kg SubCUTAneous BID CANDIS Sanchez   120 mg at 07/17/22 6808    gabapentin (NEURONTIN) capsule 300 mg  300 mg Oral Nightly CANDIS Sanchez   300 mg at 07/16/22 2011    atorvastatin (LIPITOR) tablet 10 mg  10 mg Oral Daily CANDIS Sanchez   10 mg at 07/16/22 2010    melatonin tablet 9 mg  9 mg Oral Nightly PRN CANDIS Sanchez   9 mg at 07/16/22 2010    QUEtiapine (SEROQUEL) tablet 50 mg  50 mg Oral Nightly CANDIS Sanchez   50 mg at 07/16/22 2011       Social History     Socioeconomic History    Marital status:      Spouse name: Not on file    Number of children: Not on file    Years of education: Not on file    Highest education level: Not on file   Occupational History    Not on file   Tobacco Use    Smoking status: Never    Smokeless tobacco: Current     Types: Snuff   Substance and Sexual Activity    Alcohol use: Not on file    Drug use: Not on file    Sexual activity: Not on file   Other Topics Concern    Not on file   Social History Narrative    Not on file     Social Determinants of Health     Financial Resource Strain: Not on file   Food Insecurity: Not on file   Transportation Needs: Not on file   Physical Activity: Not on file   Stress: Not on file   Social Connections: Not on file   Intimate Partner Violence: Not on file   Housing Stability: Not on file       History reviewed. No pertinent family history. Review of Systems:   Heart: as above   Lungs: as above   Eyes: denies changes in vision or discharge. Ears: denies changes in hearing or pain. Nose: denies epistaxis or masses   Throat: denies sore throat or trouble swallowing. Neuro: denies numbness, tingling, tremors. Skin: denies rashes or itching.    : denies hematuria, dysuria   GI: denies vomiting, diarrhea   Psych: denies mood changed, anxiety, depression. all others negative. Physical Exam   BP (!) 135/91   Pulse 84   Temp 98.6 °F (37 °C) (Oral)   Resp 18   Ht 6' 1\" (1.854 m)   Wt 244 lb 9.6 oz (110.9 kg)   SpO2 97%   BMI 32.27 kg/m²   Constitutional: Oriented to person, place, and time. Well-developed and well-nourished. No distress. Head: Normocephalic and atraumatic. Eyes: EOM are normal. Pupils are equal, round, and reactive to light. Neck: Normal range of motion. Neck supple. No hepatojugular reflux and no JVD present. Carotid bruit is not present. No tracheal deviation present. No thyromegaly present. Cardiovascular: Normal rate, regular rhythm, normal heart sounds and intact distal pulses. Exam reveals no gallop and no friction rub. No murmur heard. Pulmonary/Chest: Effort normal and breath sounds normal. No respiratory distress. No wheezes. No rales. No tenderness. Abdominal: Soft. Bowel sounds are normal. No distension and no mass. No tenderness. No rebound and no guarding. Musculoskeletal: Normal range of motion. No edema and no tenderness. Lymphadenopathy:   No cervical adenopathy. No groin adenopathy. Neurological: Alert and oriented to person, place, and time. Skin: Skin is warm and dry. No rash noted. Not diaphoretic. No erythema. Psychiatric: Normal mood and affect.  Behavior is normal.     CBC:   Lab Results   Component Value Date/Time    WBC 7.1 07/15/2022 07:03 PM    RBC 4.78 07/15/2022 07:03 PM    HGB 13.5 07/15/2022 07:03 PM    HCT 40.2 07/15/2022 07:03 PM    MCV 84.1 07/15/2022 07:03 PM    MCH 28.2 07/15/2022 07:03 PM    MCHC 33.6 07/15/2022 07:03 PM    RDW 12.9 07/15/2022 07:03 PM     07/15/2022 07:03 PM    MPV 9.4 07/15/2022 07:03 PM     BMP:   Lab Results   Component Value Date/Time     07/17/2022 03:32 AM    K 4.4 07/17/2022 03:32 AM    K 4.3 07/15/2022 07:03 PM    CL 98 07/17/2022 03:32 AM    CO2 27 07/17/2022 03:32 AM    BUN 10 07/17/2022 03:32 AM    LABALBU 4.2 03/19/2019 06:29 PM    CREATININE 1.3 07/17/2022 03:32 AM    CALCIUM 9.4 07/17/2022 03:32 AM    GFRAA >60 07/17/2022 03:32 AM    LABGLOM 58 07/17/2022 03:32 AM     Magnesium:    Lab Results   Component Value Date/Time    MG 1.6 07/15/2022 07:03 PM     Cardiac Enzymes:   Lab Results   Component Value Date    TROPHS 10 07/16/2022    TROPHS 9 07/16/2022    TROPHS 8 07/15/2022      PT/INR:  No results found for: PROTIME, INR  TSH:    Lab Results   Component Value Date/Time    TSH 2.600 07/15/2022 07:03 PM     Rhythm Strip: atrial fibrillation. EKG:  nonspecific ST and T waves changes, atrial fibrillation. Echo Summary 7/16/2022:   Ejection fraction is visually estimated at 60%. No regional wall motion abnormalities seen. Severe left ventricular concentric hypertrophy noted. Normal right ventricle structure and function. Left atrial volume index of 26 ml per meters squared BSA. Physiologic and/or trace mitral regurgitation is present. Physiologic and/or trace tricuspid regurgitation. No evidence for hemodynamically significant pericardial effusion. ASSESSMENT AND PLAN:  Patient Active Problem List   Diagnosis    Atrial fibrillation with rapid ventricular response (HCC)    Atrial fibrillation with RVR ([address]     1. Afib:    Chart/labs/EKG reviewed. Echo as above. Rate control. Transition to PO BB. Lovenox for now. DOAC on discharge. JOSE-DCCV Monday AM.    2. HTN: Observe. Candis Amin D.O.   Cardiologist  Cardiology, [facility]

## 2022-07-17 NOTE — ANESTHESIA PRE PROCEDURE
Department of Anesthesiology  Preprocedure Note       Name:  Chen Cuenca   Age:  45 y.o.  :  1970                                          MRN:  [id number]         Date:  2022      Surgeon: Nohemi Pop    Procedure: JOSE AND CARDIOVERSION    Medications prior to admission:   Prior to Admission medications    Medication Sig Start Date End Date Taking? Authorizing Provider   QUEtiapine (SEROQUEL) 50 MG tablet Take 50 mg by mouth nightly Take 3 tablets at bedtime   Yes Historical Provider, MD   lovastatin (MEVACOR) 40 MG tablet Take 40 mg by mouth nightly   Yes Historical Provider, MD   metoprolol tartrate (LOPRESSOR) 50 MG tablet Take 50 mg by mouth in the morning and 50 mg before bedtime. Yes Historical Provider, MD   melatonin 3 MG TABS tablet Take 10 mg by mouth nightly as needed   Yes Historical Provider, MD   gabapentin (NEURONTIN) 300 MG capsule Take 300 mg by mouth nightly. Yes Historical Provider, MD   lamoTRIgine (LAMICTAL) 200 MG tablet Take 200 mg by mouth nightly    Historical Provider, MD   QUEtiapine (SEROQUEL XR) 200 MG XR tablet Take 200 mg by mouth nightly. Historical Provider, MD   QUEtiapine (SEROQUEL) 25 MG tablet Take 25 mg by mouth nightly    Historical Provider, MD   zolpidem (AMBIEN) 10 MG tablet Take 10 mg by mouth nightly as needed. Historical Provider, MD   clonazePAM (KLONOPIN) 2 MG tablet Take 2 mg by mouth 3 times daily as needed. Historical Provider, MD   lisinopril (PRINIVIL;ZESTRIL) 20 MG tablet Take 40 mg by mouth in the morning. Orlando Martinez     Historical Provider, MD       Current medications:    Current Facility-Administered Medications   Medication Dose Route Frequency Provider Last Rate Last Admin    docusate sodium (COLACE) capsule 100 mg  100 mg Oral Nightly Gage Moore, DO        trimethobenzamide [place]) injection 200 mg  200 mg IntraMUSCular Q6H PRN Flavia Juarez DO        perflutren lipid microspheres (DEFINITY) injection 1.65 mg  1.5 History reviewed. No pertinent surgical history. Social History:    Social History     Tobacco Use    Smoking status: Never    Smokeless tobacco: Current     Types: Snuff   Substance Use Topics    Alcohol use: Not on file                                Ready to quit: Not Answered  Counseling given: Not Answered      Vital Signs (Current):   Vitals:    07/17/22 0015 07/17/22 0039 07/17/22 0315 07/17/22 0830   BP: 127/70  (!) 150/100 (!) 135/91   Pulse: 91  88 84   Resp:   16 18   Temp:   97.8 °F (36.6 °C) 98.6 °F (37 °C)   TempSrc:   Oral Oral   SpO2:    97%   Weight:  244 lb 9.6 oz (110.9 kg)     Height:                                                  BP Readings from Last 3 Encounters:   07/17/22 (!) 135/91   03/19/19 (!) 169/109       NPO Status:                                                                                 BMI:   Wt Readings from Last 3 Encounters:   07/17/22 244 lb 9.6 oz (110.9 kg)   03/19/19 240 lb (108.9 kg)     Body mass index is 32.27 kg/m².     CBC:   Lab Results   Component Value Date/Time    WBC 7.1 07/15/2022 07:03 PM    RBC 4.78 07/15/2022 07:03 PM    HGB 13.5 07/15/2022 07:03 PM    HCT 40.2 07/15/2022 07:03 PM    MCV 84.1 07/15/2022 07:03 PM    RDW 12.9 07/15/2022 07:03 PM     07/15/2022 07:03 PM       CMP:   Lab Results   Component Value Date/Time     07/17/2022 03:32 AM    K 4.4 07/17/2022 03:32 AM    K 4.3 07/15/2022 07:03 PM    CL 98 07/17/2022 03:32 AM    CO2 27 07/17/2022 03:32 AM    BUN 10 07/17/2022 03:32 AM    CREATININE 1.3 07/17/2022 03:32 AM    GFRAA >60 07/17/2022 03:32 AM    LABGLOM 58 07/17/2022 03:32 AM    GLUCOSE 106 07/17/2022 03:32 AM    PROT 7.8 03/19/2019 06:29 PM    CALCIUM 9.4 07/17/2022 03:32 AM    BILITOT 0.3 03/19/2019 06:29 PM    ALKPHOS 74 03/19/2019 06:29 PM    AST 20 03/19/2019 06:29 PM    ALT 31 03/19/2019 06:29 PM       POC Tests: No results for input(s): POCGLU, POCNA, POCK, POCCL, POCBUN, POCHEMO, POCHCT in the last 72 hours.    Coags: No results found for: PROTIME, INR, APTT    HCG (If Applicable): No results found for: PREGTESTUR, PREGSERUM, HCG, HCGQUANT     ABGs: No results found for: PHART, PO2ART, BIS4DNY, RBC6KPD, BEART, [id number]     Type & Screen (If Applicable):  No results found for: LABABO, LABRH    Drug/Infectious Status (If Applicable):  No results found for: HIV, HEPCAB    COVID-19 Screening (If Applicable): No results found for: COVID19        Anesthesia Evaluation  Patient summary reviewed no history of anesthetic complications:   Airway:           Dental:          Pulmonary:                              Cardiovascular:    (+) hypertension:, dysrhythmias: atrial fibrillation, hyperlipidemia         Beta Blocker:  Dose within 24 Hrs         Neuro/Psych:   (+) psychiatric history:depression/anxiety             GI/Hepatic/Renal:             Endo/Other:                     Abdominal:             Vascular: Other Findings:           Anesthesia Plan      MAC     ASA 3       Induction: intravenous. MIPS: Prophylactic antiemetics administered. Anesthetic plan and risks discussed with patient. Plan discussed with CRNA.                     Chart reviewed per routine on July 17, 2022 at 12:14 PM by Everett Wall DO.  (Final assessment and plan per day of surgery team.)  Everett Wall DO   7/17/2022

## 2022-07-17 NOTE — PLAN OF CARE
Problem: Discharge Planning  Goal: Discharge to home or other facility with appropriate resources  7/17/2022 1000 by Maria Victoria Martinez, RN  Outcome: Progressing  Flowsheets (Taken 7/17/2022 0830)  Discharge to home or other facility with appropriate resources: Identify barriers to discharge with patient and caregiver  7/16/2022 2127 by Sarai Acosta, RN  Outcome: Progressing

## 2022-07-18 ENCOUNTER — APPOINTMENT (OUTPATIENT)
Dept: NON INVASIVE DIAGNOSTICS | Age: 52
DRG: 310 | End: 2022-07-18
Payer: MEDICARE

## 2022-07-18 ENCOUNTER — ANESTHESIA EVENT (OUTPATIENT)
Dept: NON INVASIVE DIAGNOSTICS | Age: 52
DRG: 310 | End: 2022-07-18
Payer: MEDICARE

## 2022-07-18 ENCOUNTER — ANESTHESIA (OUTPATIENT)
Dept: NON INVASIVE DIAGNOSTICS | Age: 52
DRG: 310 | End: 2022-07-18
Payer: MEDICARE

## 2022-07-18 VITALS
SYSTOLIC BLOOD PRESSURE: 165 MMHG | BODY MASS INDEX: 32.2 KG/M2 | HEART RATE: 71 BPM | HEIGHT: 73 IN | TEMPERATURE: 97.7 F | OXYGEN SATURATION: 98 % | WEIGHT: 243 LBS | DIASTOLIC BLOOD PRESSURE: 82 MMHG | RESPIRATION RATE: 20 BRPM

## 2022-07-18 LAB
ANION GAP SERPL CALCULATED.3IONS-SCNC: 10 MMOL/L (ref 7–16)
BUN BLDV-MCNC: 12 MG/DL (ref 6–20)
CALCIUM SERPL-MCNC: 9.1 MG/DL (ref 8.6–10.2)
CHLORIDE BLD-SCNC: 98 MMOL/L (ref 98–107)
CO2: 25 MMOL/L (ref 22–29)
CREAT SERPL-MCNC: 1.2 MG/DL (ref 0.7–1.2)
EKG ATRIAL RATE: 416 BPM
EKG ATRIAL RATE: 416 BPM
EKG Q-T INTERVAL: 312 MS
EKG Q-T INTERVAL: 382 MS
EKG QRS DURATION: 82 MS
EKG QRS DURATION: 92 MS
EKG QTC CALCULATION (BAZETT): 426 MS
EKG QTC CALCULATION (BAZETT): 451 MS
EKG R AXIS: 55 DEGREES
EKG R AXIS: 66 DEGREES
EKG T AXIS: 10 DEGREES
EKG T AXIS: 45 DEGREES
EKG VENTRICULAR RATE: 126 BPM
EKG VENTRICULAR RATE: 75 BPM
GFR AFRICAN AMERICAN: >60
GFR NON-AFRICAN AMERICAN: >60 ML/MIN/1.73
GLUCOSE BLD-MCNC: 105 MG/DL (ref 74–99)
POTASSIUM SERPL-SCNC: 4 MMOL/L (ref 3.5–5)
SODIUM BLD-SCNC: 133 MMOL/L (ref 132–146)

## 2022-07-18 PROCEDURE — B24BZZ4 ULTRASONOGRAPHY OF HEART WITH AORTA, TRANSESOPHAGEAL: ICD-10-PCS | Performed by: INTERNAL MEDICINE

## 2022-07-18 PROCEDURE — 3700000001 HC ADD 15 MINUTES (ANESTHESIA)

## 2022-07-18 PROCEDURE — 6360000002 HC RX W HCPCS: Performed by: NURSE ANESTHETIST, CERTIFIED REGISTERED

## 2022-07-18 PROCEDURE — 2580000003 HC RX 258: Performed by: PHYSICIAN ASSISTANT

## 2022-07-18 PROCEDURE — 7100000011 HC PHASE II RECOVERY - ADDTL 15 MIN

## 2022-07-18 PROCEDURE — 36415 COLL VENOUS BLD VENIPUNCTURE: CPT

## 2022-07-18 PROCEDURE — 92960 CARDIOVERSION ELECTRIC EXT: CPT

## 2022-07-18 PROCEDURE — 6360000002 HC RX W HCPCS

## 2022-07-18 PROCEDURE — 5A2204Z RESTORATION OF CARDIAC RHYTHM, SINGLE: ICD-10-PCS | Performed by: INTERNAL MEDICINE

## 2022-07-18 PROCEDURE — 6370000000 HC RX 637 (ALT 250 FOR IP): Performed by: INTERNAL MEDICINE

## 2022-07-18 PROCEDURE — 92960 CARDIOVERSION ELECTRIC EXT: CPT | Performed by: INTERNAL MEDICINE

## 2022-07-18 PROCEDURE — 93320 DOPPLER ECHO COMPLETE: CPT

## 2022-07-18 PROCEDURE — 99233 SBSQ HOSP IP/OBS HIGH 50: CPT | Performed by: INTERNAL MEDICINE

## 2022-07-18 PROCEDURE — 93005 ELECTROCARDIOGRAM TRACING: CPT | Performed by: INTERNAL MEDICINE

## 2022-07-18 PROCEDURE — 6360000002 HC RX W HCPCS: Performed by: PHYSICIAN ASSISTANT

## 2022-07-18 PROCEDURE — 93312 ECHO TRANSESOPHAGEAL: CPT

## 2022-07-18 PROCEDURE — 7100000010 HC PHASE II RECOVERY - FIRST 15 MIN

## 2022-07-18 PROCEDURE — 80048 BASIC METABOLIC PNL TOTAL CA: CPT

## 2022-07-18 PROCEDURE — 93325 DOPPLER ECHO COLOR FLOW MAPG: CPT

## 2022-07-18 PROCEDURE — 3700000000 HC ANESTHESIA ATTENDED CARE

## 2022-07-18 RX ORDER — PROPOFOL 10 MG/ML
INJECTION, EMULSION INTRAVENOUS PRN
Status: DISCONTINUED | OUTPATIENT
Start: 2022-07-18 | End: 2022-07-18 | Stop reason: SDUPTHER

## 2022-07-18 RX ORDER — METOPROLOL SUCCINATE 100 MG/1
100 TABLET, EXTENDED RELEASE ORAL 2 TIMES DAILY
Qty: 30 TABLET | Refills: 3 | Status: SHIPPED | OUTPATIENT
Start: 2022-07-18

## 2022-07-18 RX ORDER — ATORVASTATIN CALCIUM 10 MG/1
10 TABLET, FILM COATED ORAL DAILY
Qty: 30 TABLET | Refills: 3 | Status: SHIPPED | OUTPATIENT
Start: 2022-07-18

## 2022-07-18 RX ADMIN — METOPROLOL SUCCINATE 100 MG: 100 TABLET, EXTENDED RELEASE ORAL at 08:36

## 2022-07-18 RX ADMIN — PROPOFOL 220 MG: 10 INJECTION, EMULSION INTRAVENOUS at 10:01

## 2022-07-18 RX ADMIN — SODIUM CHLORIDE: 9 INJECTION, SOLUTION INTRAVENOUS at 09:56

## 2022-07-18 RX ADMIN — ENOXAPARIN SODIUM 120 MG: 150 INJECTION SUBCUTANEOUS at 08:36

## 2022-07-18 RX ADMIN — Medication 10 ML: at 08:30

## 2022-07-18 ASSESSMENT — PAIN SCALES - GENERAL
PAINLEVEL_OUTOF10: 0

## 2022-07-18 ASSESSMENT — ENCOUNTER SYMPTOMS: SHORTNESS OF BREATH: 1

## 2022-07-18 NOTE — PROGRESS NOTES
Occupational Therapy      Occupational Therapy referral received. Spoke with patient, explained reason for visit. Patient reports he is has been walking to/from bath and able to complete his own self care.   At this time feels no need for OT services  OT order discontinued

## 2022-07-18 NOTE — PROGRESS NOTES
Discharge paperwork initiated in the computer. Educations resolved. Follow ups added and med details completed on the AVS for discharge this evening.

## 2022-07-18 NOTE — PROGRESS NOTES
CHIEF COMPLAINT: New onset Afib    HISTORY OF PRESENT ILLNESS: Patient is a 45 y.o. male seen at the request of Juana Eubanks MD and not followed by cardiology. States he has been having palpitations, SOB and chest symptoms for several weeks. Noted to have afib with RVR prompting cardiology consultation. Denies prior cardiac history. States some SOB currently.      Past Medical History:   Diagnosis Date    Anxiety     Depression     Hypertension        Patient Active Problem List   Diagnosis    Atrial fibrillation with rapid ventricular response ([address]    Atrial fibrillation with RVR ([address]       No Known Allergies    Current Facility-Administered Medications   Medication Dose Route Frequency Provider Last Rate Last Admin    clonazePAM (KLONOPIN) tablet 2 mg  2 mg Oral Q8H PRN Neldon Blackbird, DO   2 mg at 07/17/22 2135    lamoTRIgine (LAMICTAL) tablet 200 mg  200 mg Oral Nightly Gage Kareen Fling, DO   200 mg at 07/17/22 2135    QUEtiapine (SEROQUEL) tablet 75 mg  75 mg Oral Nightly Gage Kareen Fling, DO   75 mg at 07/17/22 2136    metoprolol succinate (TOPROL XL) extended release tablet 100 mg  100 mg Oral BID Nadine Crystal, DO   100 mg at 07/18/22 4163    docusate sodium (COLACE) capsule 100 mg  100 mg Oral Nightly Gage Kareen Fling, DO        trimethobenzamide [facility]) injection 200 mg  200 mg IntraMUSCular Q6H PRN Neldon Blackbird, DO        perflutren lipid microspheres (DEFINITY) injection 1.65 mg  1.5 mL IntraVENous ONCE PRN Nadine Crystal, DO        hydrALAZINE (APRESOLINE) injection 5 mg  5 mg IntraVENous Q6H PRN April Ailyn-Fresno, APRN - CNP   5 mg at 07/17/22 2339    sodium chloride flush 0.9 % injection 5-40 mL  5-40 mL IntraVENous 2 times per day CANDIS Acosta   10 mL at 07/17/22 2137    sodium chloride flush 0.9 % injection 5-40 mL  5-40 mL IntraVENous PRN CANDIS Acosta   10 mL at 07/17/22 0328    0.9 % sodium chloride infusion   IntraVENous PRN CANDIS Acosta polyethylene glycol (GLYCOLAX) packet 17 g  17 g Oral Daily PRN Miller Venegas, PA        acetaminophen (TYLENOL) tablet 650 mg  650 mg Oral Q6H PRN CANDIS Reddy        Or    acetaminophen (TYLENOL) suppository 650 mg  650 mg Rectal Q6H PRN Miller Venegas, PA        promethazine (PHENERGAN) tablet 12.5 mg  12.5 mg Oral Q6H PRN CANDIS Reddy        Or    ondansetron (ZOFRAN) injection 4 mg  4 mg IntraVENous Q6H PRN Miller Venegas, PA        enoxaparin (LOVENOX) injection 120 mg  1 mg/kg SubCUTAneous BID Miller Venegas, PA   120 mg at 07/18/22 0836    gabapentin (NEURONTIN) capsule 300 mg  300 mg Oral Nightly Miller Venegas, PA   300 mg at 07/17/22 2135    atorvastatin (LIPITOR) tablet 10 mg  10 mg Oral Daily Miller Venegas, PA   10 mg at 07/17/22 2135    melatonin tablet 9 mg  9 mg Oral Nightly PRN Miller Venegas, PA   9 mg at 07/16/22 2010       Social History     Socioeconomic History    Marital status:      Spouse name: Not on file    Number of children: Not on file    Years of education: Not on file    Highest education level: Not on file   Occupational History    Not on file   Tobacco Use    Smoking status: Never    Smokeless tobacco: Current     Types: Snuff   Substance and Sexual Activity    Alcohol use: Not on file    Drug use: Not on file    Sexual activity: Not on file   Other Topics Concern    Not on file   Social History Narrative    Not on file     Social Determinants of Health     Financial Resource Strain: Not on file   Food Insecurity: Not on file   Transportation Needs: Not on file   Physical Activity: Not on file   Stress: Not on file   Social Connections: Not on file   Intimate Partner Violence: Not on file   Housing Stability: Not on file       History reviewed. No pertinent family history. Review of Systems:   Heart: as above   Lungs: as above   Eyes: denies changes in vision or discharge. Ears: denies changes in hearing or pain.    Nose: denies epistaxis or masses Throat: denies sore throat or trouble swallowing. Neuro: denies numbness, tingling, tremors. Skin: denies rashes or itching. : denies hematuria, dysuria   GI: denies vomiting, diarrhea   Psych: denies mood changed, anxiety, depression. all others negative. Physical Exam   BP (!) 119/97   Pulse 80   Temp 98.2 °F (36.8 °C) (Oral)   Resp 20   Ht 6' 1\" (1.854 m)   Wt 243 lb (110.2 kg)   SpO2 98%   BMI 32.06 kg/m²   Constitutional: Oriented to person, place, and time. Well-developed and well-nourished. No distress. Head: Normocephalic and atraumatic. Eyes: EOM are normal. Pupils are equal, round, and reactive to light. Neck: Normal range of motion. Neck supple. No hepatojugular reflux and no JVD present. Carotid bruit is not present. No tracheal deviation present. No thyromegaly present. Cardiovascular: Normal rate, regular rhythm, normal heart sounds and intact distal pulses. Exam reveals no gallop and no friction rub. No murmur heard. Pulmonary/Chest: Effort normal and breath sounds normal. No respiratory distress. No wheezes. No rales. No tenderness. Abdominal: Soft. Bowel sounds are normal. No distension and no mass. No tenderness. No rebound and no guarding. Musculoskeletal: Normal range of motion. No edema and no tenderness. Lymphadenopathy:   No cervical adenopathy. No groin adenopathy. Neurological: Alert and oriented to person, place, and time. Skin: Skin is warm and dry. No rash noted. Not diaphoretic. No erythema. Psychiatric: Normal mood and affect.  Behavior is normal.     CBC:   Lab Results   Component Value Date/Time    WBC 7.1 07/15/2022 07:03 PM    RBC 4.78 07/15/2022 07:03 PM    HGB 13.5 07/15/2022 07:03 PM    HCT 40.2 07/15/2022 07:03 PM    MCV 84.1 07/15/2022 07:03 PM    MCH 28.2 07/15/2022 07:03 PM    MCHC 33.6 07/15/2022 07:03 PM    RDW 12.9 07/15/2022 07:03 PM     07/15/2022 07:03 PM    MPV 9.4 07/15/2022 07:03 PM     BMP:   Lab Results   Component Value Date/Time     07/18/2022 02:25 AM    K 4.0 07/18/2022 02:25 AM    K 4.3 07/15/2022 07:03 PM    CL 98 07/18/2022 02:25 AM    CO2 25 07/18/2022 02:25 AM    BUN 12 07/18/2022 02:25 AM    LABALBU 4.2 03/19/2019 06:29 PM    CREATININE 1.2 07/18/2022 02:25 AM    CALCIUM 9.1 07/18/2022 02:25 AM    GFRAA >60 07/18/2022 02:25 AM    LABGLOM >60 07/18/2022 02:25 AM     Magnesium:    Lab Results   Component Value Date/Time    MG 1.6 07/15/2022 07:03 PM     Cardiac Enzymes:   Lab Results   Component Value Date    TROPHS 10 07/16/2022    TROPHS 9 07/16/2022    TROPHS 8 07/15/2022      PT/INR:  No results found for: PROTIME, INR  TSH:    Lab Results   Component Value Date/Time    TSH 2.600 07/15/2022 07:03 PM     Rhythm Strip: atrial fibrillation. EKG:  nonspecific ST and T waves changes, atrial fibrillation. Echo Summary 7/16/2022:   Ejection fraction is visually estimated at 60%. No regional wall motion abnormalities seen. Severe left ventricular concentric hypertrophy noted. Normal right ventricle structure and function. Left atrial volume index of 26 ml per meters squared BSA. Physiologic and/or trace mitral regurgitation is present. Physiologic and/or trace tricuspid regurgitation. No evidence for hemodynamically significant pericardial effusion. ASSESSMENT AND PLAN:  Patient Active Problem List   Diagnosis    Atrial fibrillation with rapid ventricular response (HCC)    Atrial fibrillation with RVR ([address]     1. Afib:    Chart/labs/EKG reviewed. Echo as above. BB. Start Xarelto. JOSE-DCCV today. Home later today if no issues. 2. HTN: Observe. Jt Sherwood D.O.   Cardiologist  Cardiology, [facility]

## 2022-07-18 NOTE — PROGRESS NOTES
JOSE performed and pt was cardioverted with 200 J x 1. Pt converted to NSR, HR 60s. CMR called to confirm tele pack, report called to 6S RN, pt is awaiting transport to return to room.

## 2022-07-18 NOTE — PROGRESS NOTES
P Quality Flow/Interdisciplinary Rounds Progress Note        Quality Flow Rounds held on July 18, 2022    Disciplines Attending:  Bedside Nurse, , and Nursing Unit Leadership    Josh Garcia was admitted on 7/15/2022  6:37 PM    Anticipated Discharge Date:       Disposition:    Catracho Score:  Catracho Scale Score: 22    Readmission Risk              Risk of Unplanned Readmission:  [id number]           Discussed patient goal for the day, patient clinical progression, and barriers to discharge. The following Goal(s) of the Day/Commitment(s) have been identified:   JOSE DCCV today.       Janna Welch, RN  July 18, 2022

## 2022-07-18 NOTE — ANESTHESIA POSTPROCEDURE EVALUATION
Department of Anesthesiology  Postprocedure Note    Patient: Gerardo Melchor  MRN: [id number]  YOB: 1970  Date of evaluation: 7/18/2022      Procedure Summary     Date: 07/18/22 Room / Location: [facility] Echocardiography    Anesthesia Start: 0956 Anesthesia Stop:     Procedure: TRANSESOPHAGEAL ECHO Diagnosis:     Scheduled Providers:  Responsible Provider: Jeff Saunders MD    Anesthesia Type: MAC ASA Status: 3          Anesthesia Type: No value filed.     Karina Phase I: Karina Score: 10    Karina Phase II:        Anesthesia Post Evaluation    Patient location during evaluation: PACU  Patient participation: complete - patient participated  Level of consciousness: awake and alert  Pain score: 0  Airway patency: patent  Nausea & Vomiting: no nausea and no vomiting  Complications: no  Cardiovascular status: blood pressure returned to baseline  Respiratory status: acceptable  Hydration status: euvolemic

## 2022-07-18 NOTE — CARE COORDINATION
Chart reviewed- discussed with nursing- [facility] 24/24- independent from home. Pt for cardioversion today- await prost procedure plan of care. 1454: Pt given xarelto discount card.

## 2022-07-18 NOTE — ANESTHESIA PRE PROCEDURE
Department of Anesthesiology  Preprocedure Note       Name:  Leydi Parnell   Age:  45 y.o.  :  1970                                          MRN:  [id number]         Date:  2022      Surgeon: * No surgeons listed *    Procedure: * No procedures listed *    Medications prior to admission:   Prior to Admission medications    Medication Sig Start Date End Date Taking? Authorizing Provider   QUEtiapine (SEROQUEL) 50 MG tablet Take 50 mg by mouth nightly Take 3 tablets at bedtime   Yes Historical Provider, MD   lovastatin (MEVACOR) 40 MG tablet Take 40 mg by mouth nightly   Yes Historical Provider, MD   metoprolol tartrate (LOPRESSOR) 50 MG tablet Take 50 mg by mouth in the morning and 50 mg before bedtime. Yes Historical Provider, MD   melatonin 3 MG TABS tablet Take 10 mg by mouth nightly as needed   Yes Historical Provider, MD   gabapentin (NEURONTIN) 300 MG capsule Take 300 mg by mouth nightly. Yes Historical Provider, MD   lamoTRIgine (LAMICTAL) 200 MG tablet Take 200 mg by mouth nightly    Historical Provider, MD   QUEtiapine (SEROQUEL XR) 200 MG XR tablet Take 200 mg by mouth nightly. Historical Provider, MD   QUEtiapine (SEROQUEL) 25 MG tablet Take 25 mg by mouth nightly    Historical Provider, MD   zolpidem (AMBIEN) 10 MG tablet Take 10 mg by mouth nightly as needed. Historical Provider, MD   clonazePAM (KLONOPIN) 2 MG tablet Take 2 mg by mouth 3 times daily as needed. Historical Provider, MD   lisinopril (PRINIVIL;ZESTRIL) 20 MG tablet Take 40 mg by mouth in the morning. Boubacar Navas     Historical Provider, MD       Current medications:    Current Facility-Administered Medications   Medication Dose Route Frequency Provider Last Rate Last Admin    rivaroxaban (XARELTO) tablet 20 mg  20 mg Oral Daily Will Allen DO        clonazePAM [place]) tablet 2 mg  2 mg Oral Q8H PRN Ashlyn White DO   2 mg at 22    lamoTRIgine (LAMICTAL) tablet 200 mg  200 mg Oral Nightly Gage Kurt Daren, DO   200 mg at 07/17/22 2135    QUEtiapine (SEROQUEL) tablet 75 mg  75 mg Oral Nightly Gage Donnellyaaron Singhder, DO   75 mg at 07/17/22 2136    metoprolol succinate (TOPROL XL) extended release tablet 100 mg  100 mg Oral BID Pasqual Barthel, DO   100 mg at 07/18/22 6700    docusate sodium (COLACE) capsule 100 mg  100 mg Oral Nightly Gage Singhder, DO        trimethobenzamide Kristen Sharp) injection 200 mg  200 mg IntraMUSCular Q6H PRN Jose Zarateucci, DO        perflutren lipid microspheres (DEFINITY) injection 1.65 mg  1.5 mL IntraVENous ONCE PRN Pasqual Barthel, DO        hydrALAZINE (APRESOLINE) injection 5 mg  5 mg IntraVENous Q6H PRN April Franckus, APRN - CNP   5 mg at 07/17/22 2339    sodium chloride flush 0.9 % injection 5-40 mL  5-40 mL IntraVENous 2 times per day Fede Dill, PA   10 mL at 07/17/22 2137    sodium chloride flush 0.9 % injection 5-40 mL  5-40 mL IntraVENous PRN Fede Cordova, PA   10 mL at 07/17/22 0328    0.9 % sodium chloride infusion   IntraVENous PRN Fede Cordova, PA        polyethylene glycol (GLYCOLAX) packet 17 g  17 g Oral Daily PRN Fede Mazal, PA        acetaminophen (TYLENOL) tablet 650 mg  650 mg Oral Q6H PRN Fede Mazal, PA        Or    acetaminophen (TYLENOL) suppository 650 mg  650 mg Rectal Q6H PRN Fede Mazal, PA        promethazine (PHENERGAN) tablet 12.5 mg  12.5 mg Oral Q6H PRN Granta Shaunnal, PA        Or    ondansetron (ZOFRAN) injection 4 mg  4 mg IntraVENous Q6H PRN Fede Cordova, PA        gabapentin (NEURONTIN) capsule 300 mg  300 mg Oral Nightly Socorrolla Dill, PA   300 mg at 07/17/22 2135    atorvastatin (LIPITOR) tablet 10 mg  10 mg Oral Daily Francella Dill, PA   10 mg at 07/17/22 2135    melatonin tablet 9 mg  9 mg Oral Nightly PRN Granta Dill, PA   9 mg at 07/16/22 2010       Allergies:  No Known Allergies    Problem List:    Patient Active Problem List   Diagnosis Code    Atrial fibrillation with rapid ventricular response (HCC) I48.91    Atrial fibrillation with RVR (HCC) I48.91       Past Medical History:        Diagnosis Date    Anxiety     Depression     Hypertension        Past Surgical History:  History reviewed. No pertinent surgical history. Social History:    Social History     Tobacco Use    Smoking status: Never    Smokeless tobacco: Current     Types: Snuff   Substance Use Topics    Alcohol use: Not on file                                Ready to quit: Not Answered  Counseling given: Not Answered      Vital Signs (Current):   Vitals:    07/18/22 0240 07/18/22 0834 07/18/22 0930 07/18/22 0945   BP:  117/83 137/86 (!) 119/97   Pulse:  87 80 80   Resp:  19 21 20   Temp:  36.8 °C (98.2 °F) 36.2 °C (97.2 °F)    TempSrc:  Oral Temporal    SpO2:  96% 98% 98%   Weight: 243 lb (110.2 kg)      Height:                                                  BP Readings from Last 3 Encounters:   07/18/22 (!) 119/97   03/19/19 (!) 169/109       NPO Status:                                                                                 BMI:   Wt Readings from Last 3 Encounters:   07/18/22 243 lb (110.2 kg)   03/19/19 240 lb (108.9 kg)     Body mass index is 32.06 kg/m².     CBC:   Lab Results   Component Value Date/Time    WBC 7.1 07/15/2022 07:03 PM    RBC 4.78 07/15/2022 07:03 PM    HGB 13.5 07/15/2022 07:03 PM    HCT 40.2 07/15/2022 07:03 PM    MCV 84.1 07/15/2022 07:03 PM    RDW 12.9 07/15/2022 07:03 PM     07/15/2022 07:03 PM       CMP:   Lab Results   Component Value Date/Time     07/18/2022 02:25 AM    K 4.0 07/18/2022 02:25 AM    K 4.3 07/15/2022 07:03 PM    CL 98 07/18/2022 02:25 AM    CO2 25 07/18/2022 02:25 AM    BUN 12 07/18/2022 02:25 AM    CREATININE 1.2 07/18/2022 02:25 AM    GFRAA >60 07/18/2022 02:25 AM    LABGLOM >60 07/18/2022 02:25 AM    GLUCOSE 105 07/18/2022 02:25 AM    PROT 7.8 03/19/2019 06:29 PM    CALCIUM 9.1 07/18/2022 02:25 AM    BILITOT 0.3 03/19/2019 06:29 PM    ALKPHOS abnormalities seen. Severe left ventricular concentric hypertrophy noted. Normal right ventricle structure and function. Left atrial volume index of 26 ml per meters squared BSA. Physiologic and/or trace mitral regurgitation is present. Physiologic and/or trace tricuspid regurgitation. No evidence for hemodynamically significant pericardial effusion. Signature    Anesthesia Evaluation  Patient summary reviewed and Nursing notes reviewed no history of anesthetic complications:   Airway: Mallampati: Unable to assess / NA  TM distance: >3 FB   Neck ROM: full  Mouth opening: < 3 FB   Dental:      Comment: Patient denies any loose, missing, or chipped teeth    Pulmonary: breath sounds clear to auscultation  (+) shortness of breath (exercise induced): new,                             Cardiovascular:  Exercise tolerance: poor (<4 METS),   (+) hypertension:, dysrhythmias: atrial fibrillation and atrial flutter, hyperlipidemia      ECG reviewed  Rhythm: regular  Rate: normal  Echocardiogram reviewed         Beta Blocker:  Dose within 24 Hrs         Neuro/Psych:   (+) depression/anxiety             GI/Hepatic/Renal: Neg GI/Hepatic/Renal ROS            Endo/Other: Negative Endo/Other ROS                    Abdominal:       Abdomen: soft. Vascular: negative vascular ROS. Other Findings:           Anesthesia Plan      MAC     ASA 3       Induction: intravenous. Anesthetic plan and risks discussed with patient. Plan discussed with CRNA and attending. Arianne Jimenez RN   7/18/2022      Pt seen, examined, chart reviewed, plan discussed.   Marciano Villela MD  7/18/2022  12:05 PM

## 2022-07-18 NOTE — PROGRESS NOTES
Hospitalist Progress Note      PCP: Lamar Bella MD    Date of Admission: 7/15/2022        Hospital Course:    45 y.o. male presented with  ONSET 2 WEEKS AGO, [address]. CHEST PAIN SQUEEZING IN CHARACTER, LOCATED ACW, POS SOB, .  NON RADIATING , NO NV, NO DIAPHORESIS. Had a DCCV  no in SR to dc home. Subjective: upset regarding psych meds, they were not put in the [facility] correctly           Medications:  Reviewed    Infusion Medications    sodium chloride       Scheduled Medications    rivaroxaban  20 mg Oral Daily    lamoTRIgine  200 mg Oral Nightly    QUEtiapine  75 mg Oral Nightly    metoprolol succinate  100 mg Oral BID    docusate sodium  100 mg Oral Nightly    sodium chloride flush  5-40 mL IntraVENous 2 times per day    gabapentin  300 mg Oral Nightly    atorvastatin  10 mg Oral Daily     PRN Meds: clonazePAM, trimethobenzamide, perflutren lipid microspheres, hydrALAZINE, sodium chloride flush, sodium chloride, polyethylene glycol, acetaminophen **OR** acetaminophen, promethazine **OR** ondansetron, melatonin      Intake/Output Summary (Last 24 hours) at 7/18/2022 1537  Last data filed at 7/18/2022 1009  Gross per 24 hour   Intake 200 ml   Output --   Net 200 ml       Exam:    BP (!) 165/82   Pulse 71   Temp 97.7 °F (36.5 °C) (Oral)   Resp 20   Ht 6' 1\" (1.854 m)   Wt 243 lb (110.2 kg)   SpO2 98%   BMI 32.06 kg/m²       General appearance:  No apparent distress, appears stated age and cooperative. HEENT:  Normal cephalic,  Neck: Supple, with full range of motion. No jugular venous distention. Trachea midline. Respiratory:  Normal respiratory effort. Clear to auscultation, bilaterally without Rales/Wheezes/Rhonchi. Cardiovascular:  rrr  Abdomen: Soft, non-tender, non-distended with normal bowel sounds. Musculoskeletal:  No clubbing, cyanosis or edema bilaterally. Skin: Skin color, texture, turgor normal.  No rashes or lesions.   Neurologic:  Neurovascularly intact without any focal sensory/motor deficits. Cranial nerves: II-XII intact, grossly non-focal.  Psychiatric:  Alert and oriented,            Labs:   Recent Labs     07/15/22  1903   WBC 7.1   HGB 13.5   HCT 40.2        Recent Labs     07/15/22  1903 07/17/22  0332 07/18/22  0225    137 133   K 4.3 4.4 4.0   CL 96* 98 98   CO2 23 27 25   BUN 14 10 12   CREATININE 1.3* 1.3* 1.2   CALCIUM 8.7 9.4 9.1     No results for input(s): AST, ALT, BILIDIR, BILITOT, ALKPHOS in the last 72 hours. No results for input(s): INR in the last 72 hours. No results for input(s): Ann Kras in the last 72 hours. No results for input(s): AST, ALT, ALB, BILIDIR, BILITOT, ALKPHOS in the last 72 hours.   Recent Labs     07/15/22  1903 07/16/22  0402   LACTA 1.9 1.3     No results found for: Ruben Cornelius  No results found for: AMMONIA    Assessment:  ATRIAL FIB WITH RVR  S/P DCCV CARDIOVERSION AND JOSE  HTN  HLD  ANXIETY AND DEPRESSION  Plan:     [id number] [facility]    Electronically signed by Micah Jimenez DO on 7/18/2022 at 3:37 PM [facility]

## 2022-07-18 NOTE — PROGRESS NOTES
SPEECH LANGUAGE PATHOLOGY  DAILY PROGRESS NOTE        PATIENT NAME:  Bennett Posada      :  1970          TODAY'S DATE:  2022 ROOM:  [id number]    Order noted for Speech Language Pathology Evaluation; however, unsure of reason for referral. Pt's nurse unsure as well and reported no swallow issues noted. RN checked w/ physician, who indicated that SLP evaluation not indicated at this time. Nurse to d/c order. Please re-consult if ST indicated. Thank you.

## 2022-07-20 LAB
EKG ATRIAL RATE: 65 BPM
EKG P AXIS: 52 DEGREES
EKG P-R INTERVAL: 144 MS
EKG Q-T INTERVAL: 410 MS
EKG QRS DURATION: 78 MS
EKG QTC CALCULATION (BAZETT): 426 MS
EKG R AXIS: 59 DEGREES
EKG T AXIS: 55 DEGREES
EKG VENTRICULAR RATE: 65 BPM

## 2022-07-23 NOTE — DISCHARGE SUMMARY
Hospitalist Discharge Summary    Patient ID: Natasha Pradhan   Patient : 1970  Patient's PCP: Milad Flores MD    Admit Date: 7/15/2022   Admitting Physician: Kiel Samano DO    Discharge Date:  2022   Discharge Physician: Kiel Samano DO   Discharge Condition: Stable  Discharge Disposition: Home      Discharge Diagnoses: Active Hospital Problems    Diagnosis Date Noted    Atrial fibrillation with rapid ventricular response ([address] [I48.91] 07/15/2022     Priority: Medium    Atrial fibrillation with RVR ([address] [I48.91] 07/15/2022     Priority: Medium   ATRIAL FIB WITH RVR  S/P DCCV CARDIOVERSION AND JOSE  HTN  HLD  ANXIETY AND DEPRESSION  Hospital course in brief:  45 y.o. male presented with  ONSET 2 WEEKS AGO, [address]. CHEST PAIN SQUEEZING IN CHARACTER, LOCATED ACW, POS SOB, .  NON RADIATING , NO NV, NO DIAPHORESIS. Had a DCCV  now in SR to dc home. PHYSICAL EXAM:    BP (!) 165/82   Pulse 71   Temp 97.7 °F (36.5 °C) (Oral)   Resp 20   Ht 6' 1\" (1.854 m)   Wt 243 lb (110.2 kg)   SpO2 98%   BMI 32.06 kg/m²         Prior to Admission medications    Medication Sig Start Date End Date Taking? Authorizing Provider   rivaroxaban (XARELTO) 20 MG TABS tablet Take 1 tablet by mouth Daily with supper 22  Yes Kiel Samano,    atorvastatin (LIPITOR) 10 MG tablet Take 1 tablet by mouth daily 22  Yes Manju Hua, DO   metoprolol succinate (TOPROL XL) 100 MG extended release tablet Take 1 tablet by mouth in the morning and 1 tablet before bedtime. 22  Yes Gage Joseph, DO   QUEtiapine (SEROQUEL) 50 MG tablet Take 50 mg by mouth nightly Take 3 tablets at bedtime   Yes Historical Provider, MD   melatonin 3 MG TABS tablet Take 10 mg by mouth nightly as needed   Yes Historical Provider, MD   gabapentin (NEURONTIN) 300 MG capsule Take 300 mg by mouth nightly.    Yes Historical Provider, MD   lovastatin (MEVACOR) 40 MG tablet Take 40 mg by mouth nightly  7/18/22  Historical Provider, MD   metoprolol tartrate (LOPRESSOR) 50 MG tablet Take 50 mg by mouth in the morning and 50 mg before bedtime. 7/18/22  Historical Provider, MD   lamoTRIgine (LAMICTAL) 200 MG tablet Take 200 mg by mouth nightly    Historical Provider, MD   QUEtiapine (SEROQUEL XR) 200 MG XR tablet Take 200 mg by mouth nightly. Historical Provider, MD   QUEtiapine (SEROQUEL) 25 MG tablet Take 25 mg by mouth nightly    Historical Provider, MD   zolpidem (AMBIEN) 10 MG tablet Take 10 mg by mouth nightly as needed. Historical Provider, MD   clonazePAM (KLONOPIN) 2 MG tablet Take 2 mg by mouth 3 times daily as needed. Historical Provider, MD   lisinopril (PRINIVIL;ZESTRIL) 20 MG tablet Take 40 mg by mouth in the morning. .  7/18/22  Historical Provider, MD       Consults:   IP CONSULT TO INTERNAL MEDICINE  IP CONSULT TO CARDIOLOGY  IP CONSULT TO SOCIAL WORK            Discharge Instructions / Follow up:    Future Appointments   Date Time Provider Rhonda Benedict   8/8/2022 10:00 AM Vasu Salazar DO [address] [facility]       Continued appropriate risk factor modification of blood pressure, diabetes and serum lipids will remain essential to reducing risk of future atherosclerotic development    Activity: activity as tolerated    Significant labs:  CBC:   No results for input(s): WBC, RBC, HGB, HCT, MCV, RDW, PLT in the last 72 hours. BMP: No results for input(s): NA, K, CL, CO2, BUN, CREATININE, CA, MG, PHOS in the last 72 hours. LFT:  No results for input(s): PROT, ALB, ALKPHOS, ALT, AST, BILITOT, AMYLASE, LIPASE in the last 72 hours. PT/INR: No results for input(s): INR, APTT in the last 72 hours. BNP: No results for input(s): BNP in the last 72 hours.   Hgb A1C: No results found for: LABA1C  Folate and B12: No results found for: [id number], No results found for: FOLATE  Thyroid Studies:   Lab Results   Component Value Date    TSH 2.600 07/15/2022    H7YRAXE 102.00 07/16/2022       Urinalysis:    Lab Results   Component Value Date/Time    NITRU Negative 03/19/2019 06:29 PM    BLOODU Negative 03/19/2019 06:29 PM    SPECGRAV 1.010 03/19/2019 06:29 PM    GLUCOSEU Negative 03/19/2019 06:29 PM       Imaging:  ECHO Transesophageal    Result Date: 7/18/2022  Transesophageal Echocardiography Report (JOSE)   Demographics   Patient Name          Farooq Boucher Gender                 Male   Medical Record Number [id number]     Room Number            9857   Account #             [de-identified]    Procedure Date         07/18/2022   Corporate ID                       Ordering Physician     Meenakshi Rodriguez DO   Accession Number      [id number]   Referring Physician    Bony Ramirez   Date of Birth         1970   Sonographer            Gladys Hayes [facility]   Age                   45 year(s)   Interpreting Physician Meenakshi Rodriguez DO                                      Any Other  Procedure Type of Study   JOSE procedure:Transesophageal Echo (JOSE). Procedure Date Date: 07/18/2022 Start: 09:55 AM Study Location: Portable Indications:Atrial fibrillation. Patient Status: Routine Contrast Medium: Bubble Study. Height: 62 inches Weight: 248 pounds BSA: 2.09 m^2 BMI: 45.36 kg/m^2 HR: 81 bpm BP: 146/99 mmHg JOSE Performed By: the attending and the sonographer  Type of Anesthesia: Moderate sedation   Findings   Left Ventricle  Overall ejection fraction is normal.   Right Ventricle  Normal right ventricular size and function. Left Atrium  Moderately dilated left atrium. No evidence of thrombus within left atrium or appendage. Agitated saline injected for shunt evaluation. No evidence of patent foramen ovale. No evidence of atrial septal defect. Right Atrium  Normal right atrial size. Mitral Valve  No evidence of mitral valve stenosis. Mild mitral regurgitation is  present. Tricuspid Valve  The tricuspid valve appears structurally normal.  Mild tricuspid regurgitation.    Aortic Valve  The aortic valve is trileaflet. No hemodynamically significant aortic  stenosis is present. No evidence of aortic valve regurgitation. Pulmonic Valve  Pulmonic valve is structurally normal.   Pericardial Effusion  No evidence for hemodynamically significant pericardial effusion. Aorta  Normal aortic root and ascending aorta. Conclusions   Summary  Moderately dilated left atrium. No evidence of thrombus within left atrium or appendage. Overall ejection fraction is normal.  Normal right ventricular size and function. Mild mitral regurgitation is present. Mild tricuspid regurgitation. No evidence for hemodynamically significant pericardial effusion. Signature   ----------------------------------------------------------------  Electronically signed by Gloria Rojas DO(Interpreting  physician) on 07/18/2022 11:46 AM  ----------------------------------------------------------------  [email or web address] [id number]    Echo Complete    Result Date: 7/16/2022  Transthoracic Echocardiography Report (TTE)  Demographics   Patient Name    Avtar Garcia Gender            Male   Medical Record  [id number]     Room Number       7928  Number   Account #       [de-identified]    Procedure Date    07/16/2022   Corporate ID                 Ordering          Gloria Rojas DO                               Physician   Accession       [id number]   Referring         Keenan Leonard. Laura Tyler                       Physician   Date of Birth   1970   Sonographer       Gladys MORFIN   Age             45 year(s)   Interpreting      [address] [place] [address]                               Physician         Physician Cardiology                                                 Gloria Rojas DO                                Any Other  Procedure Type of Study   TTE procedure:Echo Complete W/Doppler & Color Flow.   Procedure Date Date: 07/16/2022 Start: 01:26 PM Study Location: Portable Technical Quality: Adequate visualization Indications:Atrial fibrillation. Patient Status: Routine Height: 62 inches Weight: 248 pounds BSA: 2.09 m^2 BMI: 45.36 kg/m^2 HR: 89 bpm BP: 150/94 mmHg  Findings   Left Ventricle  Ejection fraction is visually estimated at 60%. No regional wall motion  abnormalities seen. Severe left ventricular concentric hypertrophy noted. Left ventricle size is normal. Indeterminate diastolic function. Right Ventricle  Normal right ventricle structure and function. Left Atrium  Left atrial volume index of 26 ml per meters squared BSA. Right Atrium  Normal right atrium. Mitral Valve  Mild thickening of the mitral valve leaflets. No evidence of mitral valve  stenosis. Physiologic and/or trace mitral regurgitation is present. Tricuspid Valve  The tricuspid valve appears structurally normal.  Physiologic and/or trace tricuspid regurgitation. Aortic Valve  The aortic valve is trileaflet. No hemodynamically significant aortic  stenosis is present. No evidence of aortic valve regurgitation. Pulmonic Valve  Pulmonic valve is structurally normal.   Pericardial Effusion  No evidence for hemodynamically significant pericardial effusion. Aorta  Normal aortic root and ascending aorta. Miscellaneous  The inferior vena cava diameter is normal with normal respiratory  variation. Conclusions   Summary  Ejection fraction is visually estimated at 60%. No regional wall motion abnormalities seen. Severe left ventricular concentric hypertrophy noted. Normal right ventricle structure and function. Left atrial volume index of 26 ml per meters squared BSA. Physiologic and/or trace mitral regurgitation is present. Physiologic and/or trace tricuspid regurgitation. No evidence for hemodynamically significant pericardial effusion.    Signature   ----------------------------------------------------------------  Electronically signed by Luis AGUILERAInterpreting physician) on 07/16/2022 02:55 PM  ----------------------------------------------------------------  M-Mode/2D Measurements & Calculations   LV Diastolic   LV Systolic Dimension: 2.8   AV Cusp Separation: 2.1 cmLA  Dimension: 4   cm                           Dimension: 3.6 cmAO Root  cm             LV Volume Diastolic: 81.2 ml Dimension: 3 cm  LV FS:30 %     LV Volume Systolic: 81.1 ml  LV PW          LV EDV/LV EDV Index: 72.9  Diastolic: 1.5 NU/95 JG/D^8YF ESV/LV ESV  cm             Index: 30.1 ml/14ml/ m^2     RV Diastolic Dimension: 2.9 cm  LV PW          EF Calculated: 74.1 %  Systolic: 1.7  LV Mass Index: 117 l/min*m^2 LA/Aorta: 1.2  cm                                          Ascending Aorta: 3.6 cm  Septum                                      LA volume/Index: 54 ml  Diastolic: 1.6 LVOT: 2 cm                   /26ml/m^2  cm                                          RA Area: 13.6 cm^2  Septum  Systolic: 1.8                               IVC Expiration: 1.9 cm  cm  CO: 6.4 l/min  CI: 3.06  l/m*m^2  LV Mass:  245.13 g  Doppler Measurements & Calculations   MV Peak E-Wave: 1.39 AV Peak Velocity:     LVOT Peak Velocity: 1.22 m/s  m/s                  1.35 m/s              LVOT Mean Velocity: 0.8 m/s                       AV Peak Gradient: 7.3 LVOT Peak Gradient: 5.9  MV Peak Gradient:    mmHg                  mmHgLVOT Mean Gradient: 2.9  7.7 mmHg             AV Mean Velocity:     mmHg  MV Mean Gradient:    0.97 m/s              Estimated RVSP: 26.5 mmHg  3.2 mmHg             AV Mean Gradient: 4.1 Estimated RAP:3 mmHg  MV Mean Velocity:    mmHg  0.81 m/s             AV VTI: 26.4 cm  MV Deceleration      AV Area               TR Velocity:2.42 m/s  Time: 165.4 msec     (Continuity):2.72     TR Gradient:23.48 mmHg  MV P1/2t: 52.8 msec  cm^2                  PV Peak Velocity: 0.69 m/s  MVA by PHT:4.17 cm^2                       PV Peak Gradient: 1.91 mmHg  MV Area              LVOT VTI: 22.9 cm     PV Mean Velocity: 0.49 m/s  (continuity): 3.3                          PV Mean Gradient: 1.1 mmHg  cm^2                 Estimated PASP: 26.48  MV E' Septal         mmHg  Velocity: 0.1 m/s  MV E' Lateral  Velocity: 11 m/s  [email or web address] DocKey=[id number]    XR CHEST PORTABLE    Result Date: 7/15/2022  EXAMINATION: ONE XRAY VIEW OF THE CHEST 7/15/2022 9:41 pm COMPARISON: 15 August 2008 HISTORY: ORDERING SYSTEM PROVIDED HISTORY: eval TECHNOLOGIST PROVIDED HISTORY: Reason for exam:->eval FINDINGS: Single AP erect portable chest demonstrate satisfactory expansion lungs which are clear the cardiac silhouette is normal.  There is no evidence of a pneumothorax. No acute cardiopulmonary process. Discharge Medications:      Medication List        START taking these medications      atorvastatin 10 MG tablet  Commonly known as: LIPITOR  Take 1 tablet by mouth daily  Replaces: lovastatin 40 MG tablet     metoprolol succinate 100 MG extended release tablet  Commonly known as: TOPROL XL  Take 1 tablet by mouth in the morning and 1 tablet before bedtime. rivaroxaban 20 MG Tabs tablet  Commonly known as: XARELTO  Take 1 tablet by mouth Daily with supper            CONTINUE taking these medications      clonazePAM 2 MG tablet  Commonly known as: KLONOPIN     gabapentin 300 MG capsule  Commonly known as: NEURONTIN     lamoTRIgine 200 MG tablet  Commonly known as: LAMICTAL     melatonin 3 MG Tabs tablet     * QUEtiapine 50 MG tablet  Commonly known as: SEROQUEL     * QUEtiapine 200 MG extended release tablet  Commonly known as: SEROQUEL XR     * QUEtiapine 25 MG tablet  Commonly known as: SEROQUEL     zolpidem 10 MG tablet  Commonly known as: AMBIEN           * This list has 3 medication(s) that are the same as other medications prescribed for you.  Read the directions carefully, and ask your doctor or other care provider to review them with you. STOP taking these medications      lisinopril 20 MG tablet  Commonly known as: PRINIVIL;ZESTRIL     lovastatin 40 MG tablet  Commonly known as: MEVACOR  Replaced by: atorvastatin 10 MG tablet     metoprolol tartrate 50 MG tablet  Commonly known as: LOPRESSOR               Where to Get Your Medications        These medications were sent to [address] [place] [address] [address], [address] [address]      Phone: [phone]   atorvastatin 10 MG tablet  metoprolol succinate 100 MG extended release tablet  rivaroxaban 20 MG Tabs tablet         Time Spent on discharge is more than 45 minutes in the examination, evaluation, counseling and review of medications and discharge plan.    +++++++++++++++++++++++++++++++++++++++++++++++++  Gage Paolo Face, DO  [address], [place]  +++++++++++++++++++++++++++++++++++++++++++++++++  NOTE: This report was transcribed using voice recognition software. Every effort was made to ensure accuracy; however, inadvertent computerized transcription errors may be present.

## 2022-07-31 ENCOUNTER — HOSPITAL ENCOUNTER (EMERGENCY)
Age: 52
Discharge: HOME | End: 2022-07-31
Attending: EMERGENCY MEDICINE
Payer: MEDICARE

## 2022-07-31 ENCOUNTER — APPOINTMENT (OUTPATIENT)
Dept: GENERAL RADIOLOGY | Age: 52
End: 2022-07-31
Payer: MEDICARE

## 2022-07-31 VITALS
SYSTOLIC BLOOD PRESSURE: 147 MMHG | DIASTOLIC BLOOD PRESSURE: 90 MMHG | HEIGHT: 73 IN | TEMPERATURE: 97 F | BODY MASS INDEX: 32.47 KG/M2 | WEIGHT: 245 LBS | RESPIRATION RATE: 14 BRPM | OXYGEN SATURATION: 98 % | HEART RATE: 67 BPM

## 2022-07-31 DIAGNOSIS — R07.9 CHEST PAIN, UNSPECIFIED TYPE: Primary | ICD-10-CM

## 2022-07-31 LAB
ANION GAP SERPL CALCULATED.3IONS-SCNC: 10 MMOL/L (ref 7–16)
BUN BLDV-MCNC: 11 MG/DL (ref 6–20)
CALCIUM SERPL-MCNC: 9.3 MG/DL (ref 8.6–10.2)
CHLORIDE BLD-SCNC: 100 MMOL/L (ref 98–107)
CO2: 27 MMOL/L (ref 22–29)
CREAT SERPL-MCNC: 1.2 MG/DL (ref 0.7–1.2)
GFR AFRICAN AMERICAN: >60
GFR NON-AFRICAN AMERICAN: >60 ML/MIN/1.73
GLUCOSE BLD-MCNC: 94 MG/DL (ref 74–99)
HCT VFR BLD CALC: 44.4 % (ref 37–54)
HEMOGLOBIN: 14.7 G/DL (ref 12.5–16.5)
MAGNESIUM: 1.9 MG/DL (ref 1.6–2.6)
MCH RBC QN AUTO: 27.5 PG (ref 26–35)
MCHC RBC AUTO-ENTMCNC: 33.1 % (ref 32–34.5)
MCV RBC AUTO: 83.1 FL (ref 80–99.9)
PDW BLD-RTO: 12.8 FL (ref 11.5–15)
PLATELET # BLD: 268 E9/L (ref 130–450)
PMV BLD AUTO: 8.5 FL (ref 7–12)
POTASSIUM SERPL-SCNC: 4.3 MMOL/L (ref 3.5–5)
RBC # BLD: 5.34 E12/L (ref 3.8–5.8)
SODIUM BLD-SCNC: 137 MMOL/L (ref 132–146)
TROPONIN, HIGH SENSITIVITY: 9 NG/L (ref 0–11)
WBC # BLD: 7.9 E9/L (ref 4.5–11.5)

## 2022-07-31 PROCEDURE — 93005 ELECTROCARDIOGRAM TRACING: CPT | Performed by: EMERGENCY MEDICINE

## 2022-07-31 PROCEDURE — 84484 ASSAY OF TROPONIN QUANT: CPT

## 2022-07-31 PROCEDURE — 83735 ASSAY OF MAGNESIUM: CPT

## 2022-07-31 PROCEDURE — 80048 BASIC METABOLIC PNL TOTAL CA: CPT

## 2022-07-31 PROCEDURE — 85027 COMPLETE CBC AUTOMATED: CPT

## 2022-07-31 PROCEDURE — 71045 X-RAY EXAM CHEST 1 VIEW: CPT

## 2022-07-31 PROCEDURE — 99285 EMERGENCY DEPT VISIT HI MDM: CPT

## 2022-07-31 ASSESSMENT — ENCOUNTER SYMPTOMS
NAUSEA: 0
SHORTNESS OF BREATH: 1
SORE THROAT: 0
EYE DISCHARGE: 0
SINUS PRESSURE: 0
ABDOMINAL PAIN: 0
COUGH: 0
DIARRHEA: 0
VOMITING: 0
WHEEZING: 0
EYE PAIN: 0
EYE REDNESS: 0
BACK PAIN: 0

## 2022-07-31 ASSESSMENT — PAIN - FUNCTIONAL ASSESSMENT: PAIN_FUNCTIONAL_ASSESSMENT: NONE - DENIES PAIN

## 2022-07-31 NOTE — ED PROVIDER NOTES
This patient is status post recent cardioversion. He reports last night, a he was preparing for sleep, he is very anxious. He felt as though his heart was racing. By this morning, symptoms had resolved. He does have history of anxiety and believes this may have played a role. Currently, he is asymptomatic. He does have Klonopin prescribed for this problem but, has not taken it today. The history is provided by the patient. Chest Pain  Pain location:  Substernal area  Pain quality: aching    Pain radiates to:  Does not radiate  Pain severity:  Mild  Onset quality:  Sudden  Duration:  1 hour  Timing:  Constant  Progression:  Resolved  Chronicity:  New  Relieved by:  None tried  Worsened by:  Nothing  Ineffective treatments:  None tried  Associated symptoms: shortness of breath    Associated symptoms: no abdominal pain, no altered mental status, no back pain, no cough, no fever, no headache, no nausea, no vomiting and no weakness       Review of Systems   Constitutional:  Negative for chills and fever. HENT:  Negative for ear pain, sinus pressure and sore throat. Eyes:  Negative for pain, discharge and redness. Respiratory:  Positive for shortness of breath. Negative for cough and wheezing. Cardiovascular:  Positive for chest pain. Gastrointestinal:  Negative for abdominal pain, diarrhea, nausea and vomiting. Genitourinary:  Negative for dysuria and frequency. Musculoskeletal:  Negative for arthralgias and back pain. Skin:  Negative for rash and wound. Neurological:  Negative for weakness and headaches. Hematological:  Negative for adenopathy. All other systems reviewed and are negative. Physical Exam  Vitals and nursing note reviewed. Constitutional:       Appearance: He is well-developed. Comments: Currently, patient is asymptomatic. HENT:      Head: Normocephalic and atraumatic. Eyes:      Pupils: Pupils are equal, round, and reactive to light.    Cardiovascular: Rate and Rhythm: Normal rate and regular rhythm. Heart sounds: Normal heart sounds. No murmur heard. Pulmonary:      Effort: Pulmonary effort is normal. No respiratory distress. Breath sounds: Normal breath sounds. No wheezing or rales. Abdominal:      General: Bowel sounds are normal.      Palpations: Abdomen is soft. Tenderness: There is no abdominal tenderness. There is no guarding or rebound. Musculoskeletal:      Cervical back: Normal range of motion and neck supple. Skin:     General: Skin is warm and dry. Neurological:      Mental Status: He is alert and oriented to person, place, and time. Cranial Nerves: No cranial nerve deficit. Coordination: Coordination normal.        Procedures     MDM       EKG: This EKG is signed and interpreted by me. Rate: 65  Rhythm: Sinus  Interpretation: no acute changes  Comparison: was normal           --------------------------------------------- PAST HISTORY ---------------------------------------------  Past Medical History:  has a past medical history of Anxiety, Depression, and Hypertension. Past Surgical History:  has no past surgical history on file. Social History:  reports that he has never smoked. His smokeless tobacco use includes snuff. Family History: family history is not on file. The patients home medications have been reviewed. Allergies: Patient has no known allergies.     -------------------------------------------------- RESULTS -------------------------------------------------  Labs:  Results for orders placed or performed during the hospital encounter of 07/31/22   CBC   Result Value Ref Range    WBC 7.9 4.5 - 11.5 E9/L    RBC 5.34 3.80 - 5.80 E12/L    Hemoglobin 14.7 12.5 - 16.5 g/dL    Hematocrit 44.4 37.0 - 54.0 %    MCV 83.1 80.0 - 99.9 fL    MCH 27.5 26.0 - 35.0 pg    MCHC 33.1 32.0 - 34.5 %    RDW 12.8 11.5 - 15.0 fL    Platelets 845 535 - 402 E9/L    MPV 8.5 7.0 - 12.0 fL       Radiology:  XR CHEST PORTABLE    (Results Pending)       ------------------------- NURSING NOTES AND VITALS REVIEWED ---------------------------  Date / Time Roomed:  7/31/2022  1:47 PM  ED Bed Assignment:  19/19    The nursing notes within the ED encounter and vital signs as below have been reviewed. BP (!) 147/90   Pulse 67   Temp 97 °F (36.1 °C) (Temporal)   Resp 14   Ht 6' 1\" (1.854 m)   Wt 245 lb (111.1 kg)   SpO2 98%   BMI 32.32 kg/m²   Oxygen Saturation Interpretation: Normal      ------------------------------------------ PROGRESS NOTES ------------------------------------------    I have spoken with the patient and discussed todays results, in addition to providing specific details for the plan of care and counseling regarding the diagnosis and prognosis. Their questions are answered at this time and they are agreeable with the plan. I discussed at length with them reasons for immediate return here for re evaluation. They will followup with their primary care physician by calling their office tomorrow. --------------------------------- ADDITIONAL PROVIDER NOTES ---------------------------------  At this time the patient is without objective evidence of an acute process requiring hospitalization or inpatient management. They have remained hemodynamically stable throughout their entire ED visit and are stable for discharge with outpatient follow-up. The plan has been discussed in detail and they are aware of the specific conditions for emergent return, as well as the importance of follow-up. New Prescriptions    No medications on file       Diagnosis:  1. Chest pain, unspecified type        Disposition:  Patient's disposition: Discharge to home  Patient's condition is stable.        Roosevelt Herringhoma  07/31/22 0185

## 2022-08-01 LAB
EKG ATRIAL RATE: 65 BPM
EKG P AXIS: 52 DEGREES
EKG P-R INTERVAL: 132 MS
EKG Q-T INTERVAL: 414 MS
EKG QRS DURATION: 86 MS
EKG QTC CALCULATION (BAZETT): 430 MS
EKG R AXIS: 63 DEGREES
EKG T AXIS: 49 DEGREES
EKG VENTRICULAR RATE: 65 BPM

## 2022-08-08 ENCOUNTER — OFFICE VISIT (OUTPATIENT)
Dept: CARDIOLOGY CLINIC | Age: 52
End: 2022-08-08
Payer: MEDICARE

## 2022-08-08 VITALS
DIASTOLIC BLOOD PRESSURE: 86 MMHG | SYSTOLIC BLOOD PRESSURE: 124 MMHG | WEIGHT: 246 LBS | OXYGEN SATURATION: 98 % | BODY MASS INDEX: 32.6 KG/M2 | HEART RATE: 62 BPM | RESPIRATION RATE: 18 BRPM | HEIGHT: 73 IN

## 2022-08-08 DIAGNOSIS — I48.91 ATRIAL FIBRILLATION WITH RAPID VENTRICULAR RESPONSE (HCC): Primary | ICD-10-CM

## 2022-08-08 PROCEDURE — 99214 OFFICE O/P EST MOD 30 MIN: CPT | Performed by: INTERNAL MEDICINE

## 2022-08-08 PROCEDURE — 93000 ELECTROCARDIOGRAM COMPLETE: CPT | Performed by: INTERNAL MEDICINE

## 2022-08-08 NOTE — PROGRESS NOTES
CHIEF COMPLAINT: PAF    HISTORY OF PRESENT ILLNESS: Patient is a 45 y.o. male seen at the request of Jenni Hughes MD.     Recent admission with new onset afib. Underwent JOSE-DCCV. No CP or SOB. Past Medical History:   Diagnosis Date    Anxiety     Depression     Hypertension        Patient Active Problem List   Diagnosis    Atrial fibrillation with rapid ventricular response (HCC)    Atrial fibrillation with RVR (HCC)       No Known Allergies    Current Outpatient Medications   Medication Sig Dispense Refill    rivaroxaban (XARELTO) 20 MG TABS tablet Take 1 tablet by mouth Daily with supper 30 tablet 1    atorvastatin (LIPITOR) 10 MG tablet Take 1 tablet by mouth daily 30 tablet 3    metoprolol succinate (TOPROL XL) 100 MG extended release tablet Take 1 tablet by mouth in the morning and 1 tablet before bedtime. 30 tablet 3    QUEtiapine (SEROQUEL) 50 MG tablet Take 50 mg by mouth nightly Take 3 tablets at bedtime      melatonin 3 MG TABS tablet Take 10 mg by mouth nightly as needed      gabapentin (NEURONTIN) 300 MG capsule Take 300 mg by mouth nightly. lamoTRIgine (LAMICTAL) 200 MG tablet Take 200 mg by mouth nightly      QUEtiapine (SEROQUEL XR) 200 MG XR tablet Take 200 mg by mouth nightly. QUEtiapine (SEROQUEL) 25 MG tablet Take 25 mg by mouth nightly      zolpidem (AMBIEN) 10 MG tablet Take 10 mg by mouth nightly as needed. clonazePAM (KLONOPIN) 2 MG tablet Take 2 mg by mouth 3 times daily as needed. No current facility-administered medications for this visit.        Social History     Socioeconomic History    Marital status:      Spouse name: Not on file    Number of children: Not on file    Years of education: Not on file    Highest education level: Not on file   Occupational History    Not on file   Tobacco Use    Smoking status: Never    Smokeless tobacco: Current     Types: Snuff   Vaping Use    Vaping Use: Never used   Substance and Sexual Activity Alcohol use: Yes     Comment: moderate    Drug use: Not Currently    Sexual activity: Not on file   Other Topics Concern    Not on file   Social History Narrative    Not on file     Social Determinants of Health     Financial Resource Strain: Not on file   Food Insecurity: Not on file   Transportation Needs: Not on file   Physical Activity: Not on file   Stress: Not on file   Social Connections: Not on file   Intimate Partner Violence: Not on file   Housing Stability: Not on file       No family history on file. Review of Systems:   Heart: as above   Lungs: as above   Eyes: denies changes in vision or discharge. Ears: denies changes in hearing or pain. Nose: denies epistaxis or masses   Throat: denies sore throat or trouble swallowing. Neuro: denies numbness, tingling, tremors. Skin: denies rashes or itching. : denies hematuria, dysuria   GI: denies vomiting, diarrhea   Psych: denies mood changed, anxiety, depression. all others negative. Physical Exam   /86 (Site: Left Upper Arm, Position: Sitting, Cuff Size: Medium Adult)   Resp 18   Ht 6' 1\" (1.854 m)   Wt 246 lb (111.6 kg)   SpO2 98%   BMI 32.46 kg/m²   Constitutional: Oriented to person, place, and time. Well-developed and well-nourished. No distress. Head: Normocephalic and atraumatic. Eyes: EOM are normal. Pupils are equal, round, and reactive to light. Neck: Normal range of motion. Neck supple. No hepatojugular reflux and no JVD present. Carotid bruit is not present. No tracheal deviation present. No thyromegaly present. Cardiovascular: Normal rate, regular rhythm, normal heart sounds and intact distal pulses. Exam reveals no gallop and no friction rub. No murmur heard. Pulmonary/Chest: Effort normal and breath sounds normal. No respiratory distress. No wheezes. No rales. No tenderness. Abdominal: Soft. Bowel sounds are normal. No distension and no mass. No tenderness. No rebound and no guarding.    Musculoskeletal:

## 2023-01-10 ENCOUNTER — PREP FOR PROCEDURE (OUTPATIENT)
Dept: SURGERY | Age: 53
End: 2023-01-10

## 2023-01-10 RX ORDER — SODIUM CHLORIDE 9 MG/ML
INJECTION, SOLUTION INTRAVENOUS CONTINUOUS
Status: CANCELLED | OUTPATIENT
Start: 2023-01-10

## 2023-01-10 NOTE — H&P
Name: Hermann Moore              : 1970 Sex: M  Age: 47 yrs  Acct#:  [id number]            CC: Positive ColoGuard    HPI: [The patient is a 50-year-old white male who presents with a positive ColoGuard. The patient is asymptomatic. The patient denies a family history for colorectal cancer. The patient has never been treated for diverticular disease or inflammatory bowel disease. The patient denies anorexia and weight loss. The patient denies melena and hematochezia. The patient has never had a colonoscopy. ]    Meds Prior to Visit:  Seroquel     Ambien     Klonopin     Lamictal     Lisinopril     Lovastatin     Suprep Bowel Prep Kit  17.5-3.13-1.6 gm/177ml  use as directed     Allergies:  NKDA    PMH:  Problem List: Screening for malignant neoplasm of colon, Essential hypertension  Surgical Hx:  No Past History of Procedure  Reviewed, no changes. FH:  Father:  . Mother:  Alive. Reviewed, no changes. SH:  Personal Habits:  Tobacco Use: Patient has never smoked. Alcohol: Denies alcohol use. Drug Use: Denies Drug Use. Daily Caffeine: Consumes on average 2 sodas per day. Reviewed, no changes. ROS:  Const: Reports fatigue and night sweats, but denies anorexia, anxiety, weight gain and weight loss. Eyes: Denies eye symptoms. ENMT: Denies ear symptoms. Denies nasal symptoms. Denies mouth or throat symptoms. CV: Denies hypertension and other cardiovascular symptoms. Resp: Denies respiratory symptoms. GI: Denies hepatitis, liver disease and other gastrointestinal symptoms. Musculo: Denies musculoskeletal symptoms. Skin: Denies skin, hair and nail symptoms. Breast: Denies breast problems. Neuro: Denies neurologic symptoms. Psych: Reports depression, but denies substance abuse. Endocrine: Denies diabetes, kidney disease and thyroid disease. Hema/Lymph: Denies anemia, blood disease, cancer and past transfusion. Allergy/Immuno: Denies immunosuppression. Reviewed, no changes.     Ht: 73\" 6'1\" Wt: 257lb BMI: 33.9    Exam:    Neck is supple without adenopathy  Lungs are clear to auscultation  Cardiac regular rate and rhythm without murmurs, rubs or gallops  Abdomen is soft, nondistended and nontender  Extremities without clubbing, cyanosis or edema         Assessment #1: Hx R19.5 Other fecal abnormalities   Care Plan:              Comments       : The patient will undergo a full colonoscopy. I have informed him of the risks, benefits and complications of the procedure and he is willing to proceed. Moderate complexity    I performed an updated history, physical examination, assessment and plan.              Seen by:

## 2023-01-10 NOTE — H&P (VIEW-ONLY)
Name: Stefan Moore              : 1970 Sex: M  Age: 47 yrs  Acct#:  [id number]            CC: Positive ColoGuard    HPI: [The patient is a 76-year-old white male who presents with a positive ColoGuard. The patient is asymptomatic. The patient denies a family history for colorectal cancer. The patient has never been treated for diverticular disease or inflammatory bowel disease. The patient denies anorexia and weight loss. The patient denies melena and hematochezia. The patient has never had a colonoscopy. ]    Meds Prior to Visit:  Seroquel     Ambien     Klonopin     Lamictal     Lisinopril     Lovastatin     Suprep Bowel Prep Kit  17.5-3.13-1.6 gm/177ml  use as directed     Allergies:  NKDA    PMH:  Problem List: Screening for malignant neoplasm of colon, Essential hypertension  Surgical Hx:  No Past History of Procedure  Reviewed, no changes. FH:  Father:  . Mother:  Alive. Reviewed, no changes. SH:  Personal Habits:  Tobacco Use: Patient has never smoked. Alcohol: Denies alcohol use. Drug Use: Denies Drug Use. Daily Caffeine: Consumes on average 2 sodas per day. Reviewed, no changes. ROS:  Const: Reports fatigue and night sweats, but denies anorexia, anxiety, weight gain and weight loss. Eyes: Denies eye symptoms. ENMT: Denies ear symptoms. Denies nasal symptoms. Denies mouth or throat symptoms. CV: Denies hypertension and other cardiovascular symptoms. Resp: Denies respiratory symptoms. GI: Denies hepatitis, liver disease and other gastrointestinal symptoms. Musculo: Denies musculoskeletal symptoms. Skin: Denies skin, hair and nail symptoms. Breast: Denies breast problems. Neuro: Denies neurologic symptoms. Psych: Reports depression, but denies substance abuse. Endocrine: Denies diabetes, kidney disease and thyroid disease. Hema/Lymph: Denies anemia, blood disease, cancer and past transfusion. Allergy/Immuno: Denies immunosuppression. Reviewed, no changes.     Ht: 73\" 6'1\" Wt: 257lb BMI: 33.9    Exam:    Neck is supple without adenopathy  Lungs are clear to auscultation  Cardiac regular rate and rhythm without murmurs, rubs or gallops  Abdomen is soft, nondistended and nontender  Extremities without clubbing, cyanosis or edema         Assessment #1: Hx R19.5 Other fecal abnormalities   Care Plan:              Comments       : The patient will undergo a full colonoscopy. I have informed him of the risks, benefits and complications of the procedure and he is willing to proceed. Moderate complexity    I performed an updated history, physical examination, assessment and plan.              Seen by:

## 2023-01-11 NOTE — PROGRESS NOTES
[facility] PRE-ADMISSION TESTING INSTRUCTIONS    The Preadmission Testing patient is instructed accordingly using the following criteria (check applicable):    ARRIVAL INSTRUCTIONS:  [x] Parking the day of Surgery is located in the Main Entrance lot. Upon entering the door, make an immediate right to the surgery reception desk    [x] Bring photo ID and insurance card    [] Bring in a copy of Living will or Durable Power of  papers. [x] Please be sure to arrange for responsible adult to provide transportation to and from the hospital    [x] Please arrange for responsible adult to be with you for the 24 hour period post procedure due to having anesthesia    [x] If you awake am of surgery not feeling well or have temperature >100 please call [phone]    GENERAL INSTRUCTIONS:    [x] Nothing by mouth after midnight, including gum, candy, mints or water    [x] You may brush your teeth, but do not swallow any water    [x] Take medications as instructed with 1-2 oz of water    [x] Stop herbal supplements and vitamins 5 days prior to procedure    [x] Follow preop dosing of blood thinners per physician instructions    [] Take 1/2 dose of evening insulin, but no insulin after midnight    [] No oral diabetic medications after midnight    [] If diabetic and have low blood sugar or feel symptomatic, take 1-2oz apple juice only    [] Bring inhalers day of surgery    [] Bring C-PAP/ Bi-Pap day of surgery    [] Bring urine specimen day of surgery    [x] Shower or bath with soap, lather and rinse well, AM of Surgery, no lotion, powders or creams to surgical site    [] Follow bowel prep as instructed per surgeon    [x] No tobacco products within 24 hours of surgery     [x] No alcohol or illegal drug use within 24 hours of surgery.     [x] Jewelry, body piercing's, eyeglasses, contact lenses and dentures are not permitted into surgery (bring cases)      [] Please do not wear any nail polish, make up or hair products on the day of surgery    [x] You can expect a call the business day prior to procedure to notify you if your arrival time changes    [x] If you receive a survey after surgery we would greatly appreciate your comments    [] Parent/guardian of a minor must accompany their child and remain on the premises  the entire time they are under our care     [] Pediatric patients may bring favorite toy, blanket or comfort item with them    [] A caregiver or family member must remain with the patient during their stay if they are mentally handicapped, have dementia, disoriented or unable to use a call light or would be a safety concern if left unattended    [x] Please notify surgeon if you develop any illness between now and time of surgery (cold, cough, sore throat, fever, nausea, vomiting) or any signs of infections  including skin, wounds, and dental.    []  The Outpatient Pharmacy is available to fill your prescription here on your day of surgery, ask your preop nurse for details    [x] Other instructions: Wear comfortable clothing    EDUCATIONAL MATERIALS PROVIDED:    [] PAT Preoperative Education Packet/Booklet     [] Medication List    [] Transfusion bracelet applied with instructions    [] Shower with soap, lather and rinse well, and use CHG wipes provided the evening before surgery as instructed    [] Incentive spirometer with instructions

## 2023-01-13 ENCOUNTER — ANESTHESIA (OUTPATIENT)
Dept: ENDOSCOPY | Age: 53
End: 2023-01-13
Payer: MEDICARE

## 2023-01-13 ENCOUNTER — HOSPITAL ENCOUNTER (OUTPATIENT)
Age: 53
Setting detail: OUTPATIENT SURGERY
Discharge: HOME | End: 2023-01-13
Attending: SURGERY | Admitting: SURGERY
Payer: MEDICARE

## 2023-01-13 ENCOUNTER — ANESTHESIA EVENT (OUTPATIENT)
Dept: ENDOSCOPY | Age: 53
End: 2023-01-13
Payer: MEDICARE

## 2023-01-13 VITALS
HEIGHT: 73 IN | RESPIRATION RATE: 18 BRPM | TEMPERATURE: 97.4 F | WEIGHT: 245 LBS | SYSTOLIC BLOOD PRESSURE: 132 MMHG | HEART RATE: 66 BPM | OXYGEN SATURATION: 98 % | BODY MASS INDEX: 32.47 KG/M2 | DIASTOLIC BLOOD PRESSURE: 76 MMHG

## 2023-01-13 DIAGNOSIS — R19.5 POSITIVE COLORECTAL CANCER SCREENING USING COLOGUARD TEST: ICD-10-CM

## 2023-01-13 PROCEDURE — 2580000003 HC RX 258: Performed by: NURSE ANESTHETIST, CERTIFIED REGISTERED

## 2023-01-13 PROCEDURE — 3700000001 HC ADD 15 MINUTES (ANESTHESIA): Performed by: SURGERY

## 2023-01-13 PROCEDURE — 2709999900 HC NON-CHARGEABLE SUPPLY: Performed by: SURGERY

## 2023-01-13 PROCEDURE — 88305 TISSUE EXAM BY PATHOLOGIST: CPT

## 2023-01-13 PROCEDURE — 3609010300 HC COLONOSCOPY W/BIOPSY SINGLE/MULTIPLE: Performed by: SURGERY

## 2023-01-13 PROCEDURE — 3700000000 HC ANESTHESIA ATTENDED CARE: Performed by: SURGERY

## 2023-01-13 PROCEDURE — 7100000010 HC PHASE II RECOVERY - FIRST 15 MIN: Performed by: SURGERY

## 2023-01-13 PROCEDURE — 7100000011 HC PHASE II RECOVERY - ADDTL 15 MIN: Performed by: SURGERY

## 2023-01-13 PROCEDURE — 6360000002 HC RX W HCPCS: Performed by: NURSE ANESTHETIST, CERTIFIED REGISTERED

## 2023-01-13 RX ORDER — SODIUM CHLORIDE 9 MG/ML
INJECTION, SOLUTION INTRAVENOUS CONTINUOUS
Status: DISCONTINUED | OUTPATIENT
Start: 2023-01-13 | End: 2023-01-13 | Stop reason: HOSPADM

## 2023-01-13 RX ORDER — PROPOFOL 10 MG/ML
INJECTION, EMULSION INTRAVENOUS PRN
Status: DISCONTINUED | OUTPATIENT
Start: 2023-01-13 | End: 2023-01-13 | Stop reason: SDUPTHER

## 2023-01-13 RX ORDER — SODIUM CHLORIDE 9 MG/ML
INJECTION, SOLUTION INTRAVENOUS CONTINUOUS PRN
Status: DISCONTINUED | OUTPATIENT
Start: 2023-01-13 | End: 2023-01-13 | Stop reason: SDUPTHER

## 2023-01-13 RX ADMIN — PROPOFOL 50 MG: 10 INJECTION, EMULSION INTRAVENOUS at 09:06

## 2023-01-13 RX ADMIN — PROPOFOL 50 MG: 10 INJECTION, EMULSION INTRAVENOUS at 08:53

## 2023-01-13 RX ADMIN — PROPOFOL 150 MG: 10 INJECTION, EMULSION INTRAVENOUS at 08:44

## 2023-01-13 RX ADMIN — PROPOFOL 50 MG: 10 INJECTION, EMULSION INTRAVENOUS at 08:50

## 2023-01-13 RX ADMIN — SODIUM CHLORIDE: 9 INJECTION, SOLUTION INTRAVENOUS at 08:38

## 2023-01-13 RX ADMIN — PROPOFOL 50 MG: 10 INJECTION, EMULSION INTRAVENOUS at 09:00

## 2023-01-13 RX ADMIN — PROPOFOL 50 MG: 10 INJECTION, EMULSION INTRAVENOUS at 08:47

## 2023-01-13 ASSESSMENT — PAIN SCALES - GENERAL
PAINLEVEL_OUTOF10: 0
PAINLEVEL_OUTOF10: 3
PAINLEVEL_OUTOF10: 0

## 2023-01-13 ASSESSMENT — PAIN DESCRIPTION - LOCATION: LOCATION: ABDOMEN

## 2023-01-13 ASSESSMENT — PAIN - FUNCTIONAL ASSESSMENT: PAIN_FUNCTIONAL_ASSESSMENT: 0-10

## 2023-01-13 ASSESSMENT — PAIN DESCRIPTION - PAIN TYPE: TYPE: SURGICAL PAIN

## 2023-01-13 ASSESSMENT — PAIN DESCRIPTION - DESCRIPTORS: DESCRIPTORS: PRESSURE;CRAMPING

## 2023-01-13 ASSESSMENT — PAIN DESCRIPTION - ONSET: ONSET: GRADUAL

## 2023-01-13 ASSESSMENT — PAIN DESCRIPTION - FREQUENCY: FREQUENCY: INTERMITTENT

## 2023-01-13 ASSESSMENT — LIFESTYLE VARIABLES: SMOKING_STATUS: 0

## 2023-01-13 NOTE — DISCHARGE INSTRUCTIONS
SURGERY DISCHARGE INSTRUCTIONS    You may be drowsy or lightheaded after receiving sedation or anesthesia. A responsible person should be with you for the next 24 hours. FOLLOW UP: Call office to schedule follow-up appointment     DIET: Advance your diet as tolerated. Start with light diet and progress to your normal diet as you feel like eating. If you experience nausea or repeated episodes of vomiting which persist beyond 12-24 hours, notify your doctor.     SPECIAL INSTRUCTIONS:   Call physician if you have any questions, to schedule a follow-up appointment, and if you notice any of the following:  Fever (temperature over 101ºF) or chills  Persistent nausea, vomiting, or bloating  Severe pain that is not relieved by the prescribed pain medication  Swelling or redness around your incision(s)  No bowel movement and feeling uncomfortable  Significant change in urination or no urine output for 8 hours  Excess bleeding (from the rectum or incision) - more than ½ cup that does not stop

## 2023-01-13 NOTE — ANESTHESIA PRE PROCEDURE
Department of Anesthesiology  Preprocedure Note       Name:  Samuel Cash   Age:  47 y.o.  :  1970                                          MRN:  [id number]         Date:  2023      Surgeon: Jacquelyn Guzman):  Herbie Ritter MD    Procedure: Procedure(s):  COLONOSCOPY DIAGNOSTIC    Medications prior to admission:   Prior to Admission medications    Medication Sig Start Date End Date Taking? Authorizing Provider   rivaroxaban (XARELTO) 20 MG TABS tablet Take 1 tablet by mouth Daily with supper 22   Mee Ocampo DO   atorvastatin (LIPITOR) 10 MG tablet Take 1 tablet by mouth daily 22   Gage Leija,    metoprolol succinate (TOPROL XL) 100 MG extended release tablet Take 1 tablet by mouth in the morning and 1 tablet before bedtime. 22   Gage Leija DO   QUEtiapine (SEROQUEL) 50 MG tablet Take 50 mg by mouth nightly Take 3 tablets at bedtime    Historical Provider, MD   melatonin 3 MG TABS tablet Take 10 mg by mouth nightly as needed    Historical Provider, MD   gabapentin (NEURONTIN) 300 MG capsule Take 300 mg by mouth nightly. Historical Provider, MD   lovastatin (MEVACOR) 40 MG tablet Take 40 mg by mouth nightly  22  Historical Provider, MD   metoprolol tartrate (LOPRESSOR) 50 MG tablet Take 50 mg by mouth in the morning and 50 mg before bedtime. 22  Historical Provider, MD   lamoTRIgine (LAMICTAL) 200 MG tablet Take 200 mg by mouth nightly    Historical Provider, MD   QUEtiapine (SEROQUEL XR) 200 MG XR tablet Take 200 mg by mouth nightly. Historical Provider, MD   QUEtiapine (SEROQUEL) 25 MG tablet Take 25 mg by mouth nightly    Historical Provider, MD   zolpidem (AMBIEN) 10 MG tablet Take 10 mg by mouth nightly as needed. Historical Provider, MD   clonazePAM (KLONOPIN) 2 MG tablet Take 2 mg by mouth 3 times daily as needed. Historical Provider, MD   lisinopril (PRINIVIL;ZESTRIL) 20 MG tablet Take 40 mg by mouth in the morning.   .  22 Historical Provider, MD       Current medications:    Current Facility-Administered Medications   Medication Dose Route Frequency Provider Last Rate Last Admin    0.9 % sodium chloride infusion   IntraVENous Continuous Gertrude Karimi MD           Allergies:  No Known Allergies    Problem List:    Patient Active Problem List   Diagnosis Code    Atrial fibrillation with rapid ventricular response (HCC) I48.91    Atrial fibrillation with RVR (HCC) I48.91       Past Medical History:        Diagnosis Date    Anxiety     Atrial fibrillation ([address]     Depression     Hyperlipidemia     Hypertension     Screening for colon cancer        Past Surgical History:        Procedure Laterality Date    CARDIOVERSION  06/07/2022    ENDOSCOPY, COLON, DIAGNOSTIC         Social History:    Social History     Tobacco Use    Smoking status: Never    Smokeless tobacco: Current     Types: Snuff   Substance Use Topics    Alcohol use: Yes     Comment: occasional                                Ready to quit: Not Answered  Counseling given: Not Answered      Vital Signs (Current):   Vitals:    01/11/23 1346 01/13/23 0747   BP:  138/78   Pulse:  60   Resp:  18   Temp:  97.7 °F (36.5 °C)   TempSrc:  Temporal   SpO2:  97%   Weight: 245 lb (111.1 kg)    Height: 6' 1\" (1.854 m)                                               BP Readings from Last 3 Encounters:   01/13/23 138/78   08/08/22 124/86   07/31/22 (!) 147/90       NPO Status:                                                                                 BMI:   Wt Readings from Last 3 Encounters:   01/11/23 245 lb (111.1 kg)   08/08/22 246 lb (111.6 kg)   07/31/22 245 lb (111.1 kg)     Body mass index is 32.32 kg/m².     CBC:   Lab Results   Component Value Date/Time    WBC 7.9 07/31/2022 02:26 PM    RBC 5.34 07/31/2022 02:26 PM    HGB 14.7 07/31/2022 02:26 PM    HCT 44.4 07/31/2022 02:26 PM    MCV 83.1 07/31/2022 02:26 PM    RDW 12.8 07/31/2022 02:26 PM     07/31/2022 02:26 PM       CMP:   Lab Results   Component Value Date/Time     07/31/2022 02:26 PM    K 4.3 07/31/2022 02:26 PM    K 4.3 07/15/2022 07:03 PM     07/31/2022 02:26 PM    CO2 27 07/31/2022 02:26 PM    BUN 11 07/31/2022 02:26 PM    CREATININE 1.2 07/31/2022 02:26 PM    GFRAA >60 07/31/2022 02:26 PM    LABGLOM >60 07/31/2022 02:26 PM    GLUCOSE 94 07/31/2022 02:26 PM    PROT 7.8 03/19/2019 06:29 PM    CALCIUM 9.3 07/31/2022 02:26 PM    BILITOT 0.3 03/19/2019 06:29 PM    ALKPHOS 74 03/19/2019 06:29 PM    AST 20 03/19/2019 06:29 PM    ALT 31 03/19/2019 06:29 PM       POC Tests: No results for input(s): POCGLU, POCNA, POCK, POCCL, POCBUN, POCHEMO, POCHCT in the last 72 hours. Coags: No results found for: PROTIME, INR, APTT    HCG (If Applicable): No results found for: PREGTESTUR, PREGSERUM, HCG, HCGQUANT     ABGs: No results found for: PHART, PO2ART, KMC5RDN, MET7BWN, BEART, W9WNLXMJ     Type & Screen (If Applicable):  No results found for: LABABO, LABRH    Drug/Infectious Status (If Applicable):  No results found for: HIV, HEPCAB    COVID-19 Screening (If Applicable): No results found for: COVID19        Anesthesia Evaluation  Patient summary reviewed and Nursing notes reviewed no history of anesthetic complications:   Airway: Mallampati: II  TM distance: >3 FB   Neck ROM: full  Mouth opening: > = 3 FB   Dental:      Comment: Chipped teeeth    Pulmonary:Negative Pulmonary ROS breath sounds clear to auscultation      (-) not a current smoker                           Cardiovascular:  Exercise tolerance: good (>4 METS),   (+) hypertension:, dysrhythmias: atrial fibrillation, hyperlipidemia        Rhythm: regular  Rate: normal           Beta Blocker:  Dose within 24 Hrs         Neuro/Psych:   (+) depression/anxiety             GI/Hepatic/Renal:            ROS comment: Gi blood. Endo/Other: Negative Endo/Other ROS                    Abdominal:             Vascular: negative vascular ROS.          Other Findings:           Anesthesia Plan      MAC     ASA 3       Induction: intravenous. Anesthetic plan and risks discussed with patient. DOS STAFF ADDENDUM:    Patient seen and examined, physical exam updated as needed, chart reviewed. NPO status confirmed. Anesthetic options and risks discussed with patient/legal guardian. Patient/legal guardian verbalized understanding and agrees to proceed.      SHIRA Diop - CRNA  Staff CRNA  January 13, 2023  8:51 AM                    Ayesha Marie MD   1/13/2023

## 2023-01-13 NOTE — ANESTHESIA POSTPROCEDURE EVALUATION
Department of Anesthesiology  Postprocedure Note    Patient: aYkov Houser  MRN: [id number]  YOB: 1970  Date of evaluation: 1/13/2023      Procedure Summary     Date: 01/13/23 Room / Location: [place] [address] / [facility]    Anesthesia Start: 5384 Anesthesia Stop: 1210    Procedure: COLONOSCOPY WITH BIOPSY Diagnosis:       Positive colorectal cancer screening using Cologuard test      (Positive colorectal cancer screening using Cologuard test [R19.5])    Surgeons: Arpita Comer MD Responsible Provider: Yaima King MD    Anesthesia Type: MAC ASA Status: 3          Anesthesia Type: MAC    Karina Phase I: Karina Score: 10    Karina Phase II:        Anesthesia Post Evaluation    Patient location during evaluation: PACU  Patient participation: complete - patient participated  Level of consciousness: sleepy but conscious  Pain score: 0  Airway patency: patent  Nausea & Vomiting: no nausea and no vomiting  Complications: no  Cardiovascular status: blood pressure returned to baseline  Respiratory status: acceptable  Hydration status: euvolemic

## 2023-01-13 NOTE — INTERVAL H&P NOTE
Update History & Physical    The patient's History and Physical of January 13, 2023 was reviewed with the patient and I examined the patient. There was no change. The surgical site was confirmed by the patient and me. Plan: The risks, benefits, expected outcome, and alternative to the recommended procedure have been discussed with the patient. Patient understands and wants to proceed with the procedure.      Electronically signed by Zakiya Hurtado MD on 1/13/2023 at 8:37 AM

## 2023-01-13 NOTE — OP NOTE
Operative Note      Patient: Pantera Stephens  YOB: 1970  MRN: [id number]    Date of Procedure: 1/13/2023    Pre-Op Diagnosis: Positive colorectal cancer screening using Cologuard test [R19.5]    Post-Op Diagnosis:     Polyp involving the cecum       Procedure(s):  COLONOSCOPY WITH BIOPSY    Surgeon(s):  Mattie Davis MD    Assistant:   * No surgical staff found *    Anesthesia: Monitor Anesthesia Care    Estimated Blood Loss (mL): Minimal    Complications: None    Specimens:   ID Type Source Tests Collected by Time Destination   A : cecal polyp bx  Tissue Colon SURGICAL PATHOLOGY Mattie Davis MD 1/13/2023 0901        Implants:  * No implants in log *      Drains: * No LDAs found *    Findings: As above    Detailed Description of Procedure: The patient was brought to the endoscopy suite and placed on the table in the left lateral decubitus position. The patient received anesthesia per the department of anesthesiology. A digital rectal exam was performed. No gross findings were noted. The endoscope was inserted and passed without difficulty through the entire length of the colon. The cecum was photo documented. The endoscope was retrieved. Grossly, a single polyp involving the cecum was removed with biopsy forceps. The prep was adequate with no solid debris. No other abnormalities were noted to involve the ascending, transverse, descending and sigmoid portions of the colon. The rectum was normal.  The patient tolerated procedure well. Assessment:    Single cecal polyp    Plan:    Await pathology for discussion regarding future endoscopies.     Electronically signed by aMttie Davis MD on 1/13/2023 at 9:14 AM

## 2023-10-18 ENCOUNTER — HOSPITAL ENCOUNTER (OUTPATIENT)
Age: 53
Discharge: HOME | End: 2023-10-20
Payer: MEDICARE

## 2023-10-18 ENCOUNTER — HOSPITAL ENCOUNTER (OUTPATIENT)
Dept: GENERAL RADIOLOGY | Age: 53
Discharge: HOME | End: 2023-10-20
Attending: FAMILY MEDICINE
Payer: MEDICARE

## 2023-10-18 DIAGNOSIS — R05.9 COUGH, UNSPECIFIED TYPE: ICD-10-CM

## 2023-10-18 PROCEDURE — 71046 X-RAY EXAM CHEST 2 VIEWS: CPT

## 2023-11-09 ENCOUNTER — APPOINTMENT (OUTPATIENT)
Dept: CT IMAGING | Age: 53
End: 2023-11-09
Payer: MEDICARE

## 2023-11-09 ENCOUNTER — APPOINTMENT (OUTPATIENT)
Dept: GENERAL RADIOLOGY | Age: 53
End: 2023-11-09
Payer: MEDICARE

## 2023-11-09 ENCOUNTER — HOSPITAL ENCOUNTER (EMERGENCY)
Age: 53
Discharge: HOME | End: 2023-11-09
Attending: EMERGENCY MEDICINE
Payer: MEDICARE

## 2023-11-09 VITALS
SYSTOLIC BLOOD PRESSURE: 127 MMHG | RESPIRATION RATE: 14 BRPM | WEIGHT: 250 LBS | TEMPERATURE: 97.5 F | OXYGEN SATURATION: 98 % | BODY MASS INDEX: 33.13 KG/M2 | HEART RATE: 87 BPM | HEIGHT: 73 IN | DIASTOLIC BLOOD PRESSURE: 85 MMHG

## 2023-11-09 DIAGNOSIS — R73.9 HYPERGLYCEMIA: ICD-10-CM

## 2023-11-09 DIAGNOSIS — J40 BRONCHITIS: Primary | ICD-10-CM

## 2023-11-09 LAB
ALBUMIN SERPL-MCNC: 3.4 G/DL (ref 3.5–5.2)
ALP SERPL-CCNC: 116 U/L (ref 40–129)
ALT SERPL-CCNC: 33 U/L (ref 0–40)
ANION GAP SERPL CALCULATED.3IONS-SCNC: 10 MMOL/L (ref 7–16)
AST SERPL-CCNC: 24 U/L (ref 0–39)
BASOPHILS # BLD: 0.05 K/UL (ref 0–0.2)
BASOPHILS NFR BLD: 1 % (ref 0–2)
BILIRUB SERPL-MCNC: 0.3 MG/DL (ref 0–1.2)
BUN SERPL-MCNC: 10 MG/DL (ref 6–20)
CALCIUM SERPL-MCNC: 9.1 MG/DL (ref 8.6–10.2)
CHLORIDE SERPL-SCNC: 99 MMOL/L (ref 98–107)
CO2 SERPL-SCNC: 28 MMOL/L (ref 22–29)
CREAT SERPL-MCNC: 1.2 MG/DL (ref 0.7–1.2)
EKG ATRIAL RATE: 78 BPM
EKG P AXIS: 57 DEGREES
EKG P-R INTERVAL: 144 MS
EKG Q-T INTERVAL: 378 MS
EKG QRS DURATION: 82 MS
EKG QTC CALCULATION (BAZETT): 430 MS
EKG R AXIS: 56 DEGREES
EKG T AXIS: 56 DEGREES
EKG VENTRICULAR RATE: 78 BPM
EOSINOPHIL # BLD: 0.2 K/UL (ref 0.05–0.5)
EOSINOPHILS RELATIVE PERCENT: 3 % (ref 0–6)
ERYTHROCYTE [DISTWIDTH] IN BLOOD BY AUTOMATED COUNT: 13.2 % (ref 11.5–15)
GFR SERPL CREATININE-BSD FRML MDRD: >60 ML/MIN/1.73M2
GLUCOSE SERPL-MCNC: 200 MG/DL (ref 74–99)
HCT VFR BLD AUTO: 39 % (ref 37–54)
HGB BLD-MCNC: 12.8 G/DL (ref 12.5–16.5)
IMM GRANULOCYTES # BLD AUTO: 0.03 K/UL (ref 0–0.58)
IMM GRANULOCYTES NFR BLD: 0 % (ref 0–5)
INFLUENZA A BY PCR: NOT DETECTED
INFLUENZA B BY PCR: NOT DETECTED
LYMPHOCYTES NFR BLD: 1.48 K/UL (ref 1.5–4)
LYMPHOCYTES RELATIVE PERCENT: 21 % (ref 20–42)
MCH RBC QN AUTO: 27.8 PG (ref 26–35)
MCHC RBC AUTO-ENTMCNC: 32.8 G/DL (ref 32–34.5)
MCV RBC AUTO: 84.8 FL (ref 80–99.9)
MONOCYTES NFR BLD: 0.56 K/UL (ref 0.1–0.95)
MONOCYTES NFR BLD: 8 % (ref 2–12)
NEUTROPHILS NFR BLD: 67 % (ref 43–80)
NEUTS SEG NFR BLD: 4.81 K/UL (ref 1.8–7.3)
PLATELET # BLD AUTO: 206 K/UL (ref 130–450)
PMV BLD AUTO: 8.8 FL (ref 7–12)
POTASSIUM SERPL-SCNC: 3.9 MMOL/L (ref 3.5–5)
PROT SERPL-MCNC: 6.5 G/DL (ref 6.4–8.3)
RBC # BLD AUTO: 4.6 M/UL (ref 3.8–5.8)
SARS-COV-2 RDRP RESP QL NAA+PROBE: NOT DETECTED
SODIUM SERPL-SCNC: 137 MMOL/L (ref 132–146)
SPECIMEN DESCRIPTION: NORMAL
TROPONIN I SERPL HS-MCNC: 10 NG/L (ref 0–11)
TROPONIN I SERPL HS-MCNC: 12 NG/L (ref 0–11)
WBC OTHER # BLD: 7.1 K/UL (ref 4.5–11.5)

## 2023-11-09 PROCEDURE — 6370000000 HC RX 637 (ALT 250 FOR IP): Performed by: EMERGENCY MEDICINE

## 2023-11-09 PROCEDURE — 71275 CT ANGIOGRAPHY CHEST: CPT

## 2023-11-09 PROCEDURE — 94640 AIRWAY INHALATION TREATMENT: CPT

## 2023-11-09 PROCEDURE — 84484 ASSAY OF TROPONIN QUANT: CPT

## 2023-11-09 PROCEDURE — 85025 COMPLETE CBC W/AUTO DIFF WBC: CPT

## 2023-11-09 PROCEDURE — 94664 DEMO&/EVAL PT USE INHALER: CPT

## 2023-11-09 PROCEDURE — 71046 X-RAY EXAM CHEST 2 VIEWS: CPT

## 2023-11-09 PROCEDURE — 87502 INFLUENZA DNA AMP PROBE: CPT

## 2023-11-09 PROCEDURE — 93010 ELECTROCARDIOGRAM REPORT: CPT | Performed by: INTERNAL MEDICINE

## 2023-11-09 PROCEDURE — 93005 ELECTROCARDIOGRAM TRACING: CPT

## 2023-11-09 PROCEDURE — 6370000000 HC RX 637 (ALT 250 FOR IP)

## 2023-11-09 PROCEDURE — 87635 SARS-COV-2 COVID-19 AMP PRB: CPT

## 2023-11-09 PROCEDURE — 99285 EMERGENCY DEPT VISIT HI MDM: CPT

## 2023-11-09 PROCEDURE — 80053 COMPREHEN METABOLIC PANEL: CPT

## 2023-11-09 PROCEDURE — 6360000004 HC RX CONTRAST MEDICATION: Performed by: RADIOLOGY

## 2023-11-09 RX ORDER — GUAIFENESIN 600 MG/1
600 TABLET, EXTENDED RELEASE ORAL 2 TIMES DAILY
Qty: 30 TABLET | Refills: 0 | Status: SHIPPED | OUTPATIENT
Start: 2023-11-09 | End: 2023-11-24

## 2023-11-09 RX ORDER — IPRATROPIUM BROMIDE AND ALBUTEROL SULFATE 2.5; .5 MG/3ML; MG/3ML
1 SOLUTION RESPIRATORY (INHALATION) ONCE
Status: COMPLETED | OUTPATIENT
Start: 2023-11-09 | End: 2023-11-09

## 2023-11-09 RX ORDER — ALBUTEROL SULFATE 90 UG/1
2 AEROSOL, METERED RESPIRATORY (INHALATION) 4 TIMES DAILY PRN
Qty: 18 G | Refills: 0 | Status: SHIPPED | OUTPATIENT
Start: 2023-11-09

## 2023-11-09 RX ADMIN — IOPAMIDOL 75 ML: 755 INJECTION, SOLUTION INTRAVENOUS at 14:50

## 2023-11-09 RX ADMIN — IPRATROPIUM BROMIDE AND ALBUTEROL SULFATE 1 DOSE: 2.5; .5 SOLUTION RESPIRATORY (INHALATION) at 15:30

## 2023-11-09 RX ADMIN — IPRATROPIUM BROMIDE AND ALBUTEROL SULFATE 1 DOSE: 2.5; .5 SOLUTION RESPIRATORY (INHALATION) at 14:04

## 2023-11-09 NOTE — ED PROVIDER NOTES
of breath, moderate in severity. In the ED, patient was hemodynamically stable and afebrile. On exam, patient was nontoxic in no acute respiratory distress. Labs, chest x-ray, and CTA chest with contrast obtained. Differential diagnosis includes pneumonia, bronchitis, CHF, pulmonary embolus. I reviewed and interpreted labs. CBC and CMP was unremarkable with no acute findings. No leukocytosis or anemia. CMP showed normal electrolytes and baseline kidney function. Glucose 200 but no anion gap to suggest DKA. Troponin stable x2. No acute ischemic changes on EKG. COVID and influenza swabs negative. Chest xray interpreted by me. Interpretation-lungs clear, no infiltrate. Radiologist confirms read. CTA chest showed no evidence of pulmonary embolus or other acute cardiopulmonary abnormality. Patient's PCP was consulted. I spoke with Dr. Prince Mims. No emergent findings in the ED. Patient given a DuoNeb treatment in the ED. He can continue outpatient treatment for suspected bronchitis. There is no evidence of ACS. Clinically symptoms are more consistent with a respiratory illness. Supportive care measures and ED return precautions discussed. CONSULTS: (Who and What was discussed)  IP CONSULT TO PRIMARY CARE PROVIDER    Social Determinants of Health : None    Chronic Conditions affecting care:    has a past medical history of Anxiety, Atrial fibrillation ([address]), Depression, Hyperlipidemia, Hypertension, and Screening for colon cancer. Medical Decision Making  Problems Addressed:  Bronchitis: acute illness or injury  Hyperglycemia: acute illness or injury    Amount and/or Complexity of Data Reviewed  External Data Reviewed: radiology and ECG. Labs: ordered. Decision-making details documented in ED Course. Radiology: ordered and independent interpretation performed. Decision-making details documented in ED Course. ECG/medicine tests: ordered and independent interpretation performed.

## (undated) DEVICE — GRADUATE TRIANG MEASURE 1000ML BLK PRNT

## (undated) DEVICE — FORCEPS BX L240CM JAW DIA2.4MM ORNG L CAP W/ NDL DISP RAD

## (undated) DEVICE — SPONGE GZ W4XL4IN RAYON POLY FILL CVR W/ NONWOVEN FAB